# Patient Record
Sex: MALE | Race: WHITE | NOT HISPANIC OR LATINO | URBAN - METROPOLITAN AREA
[De-identification: names, ages, dates, MRNs, and addresses within clinical notes are randomized per-mention and may not be internally consistent; named-entity substitution may affect disease eponyms.]

---

## 2017-12-26 ENCOUNTER — EMERGENCY (EMERGENCY)
Facility: HOSPITAL | Age: 62
LOS: 0 days | Discharge: HOME | End: 2017-12-27

## 2017-12-26 DIAGNOSIS — I10 ESSENTIAL (PRIMARY) HYPERTENSION: ICD-10-CM

## 2017-12-26 DIAGNOSIS — R51 HEADACHE: ICD-10-CM

## 2020-03-24 ENCOUNTER — TRANSCRIPTION ENCOUNTER (OUTPATIENT)
Age: 65
End: 2020-03-24

## 2020-04-01 ENCOUNTER — INPATIENT (INPATIENT)
Facility: HOSPITAL | Age: 65
LOS: 5 days | Discharge: HOME | End: 2020-04-07
Attending: HOSPITALIST | Admitting: HOSPITALIST
Payer: COMMERCIAL

## 2020-04-01 VITALS
HEART RATE: 118 BPM | RESPIRATION RATE: 18 BRPM | SYSTOLIC BLOOD PRESSURE: 127 MMHG | OXYGEN SATURATION: 91 % | TEMPERATURE: 100 F | WEIGHT: 205.91 LBS | HEIGHT: 69 IN | DIASTOLIC BLOOD PRESSURE: 72 MMHG

## 2020-04-01 DIAGNOSIS — A41.89 OTHER SPECIFIED SEPSIS: ICD-10-CM

## 2020-04-01 DIAGNOSIS — I10 ESSENTIAL (PRIMARY) HYPERTENSION: ICD-10-CM

## 2020-04-01 DIAGNOSIS — J96.01 ACUTE RESPIRATORY FAILURE WITH HYPOXIA: ICD-10-CM

## 2020-04-01 DIAGNOSIS — F51.04 PSYCHOPHYSIOLOGIC INSOMNIA: ICD-10-CM

## 2020-04-01 DIAGNOSIS — U07.1 COVID-19: ICD-10-CM

## 2020-04-01 DIAGNOSIS — E87.2 ACIDOSIS: ICD-10-CM

## 2020-04-01 DIAGNOSIS — J12.89 OTHER VIRAL PNEUMONIA: ICD-10-CM

## 2020-04-01 DIAGNOSIS — R65.20 SEVERE SEPSIS WITHOUT SEPTIC SHOCK: ICD-10-CM

## 2020-04-01 DIAGNOSIS — R74.0 NONSPECIFIC ELEVATION OF LEVELS OF TRANSAMINASE AND LACTIC ACID DEHYDROGENASE [LDH]: ICD-10-CM

## 2020-04-01 LAB
ALBUMIN SERPL ELPH-MCNC: 4.1 G/DL — SIGNIFICANT CHANGE UP (ref 3.5–5.2)
ALP SERPL-CCNC: 183 U/L — HIGH (ref 30–115)
ALT FLD-CCNC: 211 U/L — HIGH (ref 0–41)
ANION GAP SERPL CALC-SCNC: 17 MMOL/L — HIGH (ref 7–14)
AST SERPL-CCNC: 166 U/L — HIGH (ref 0–41)
BASE EXCESS BLDV CALC-SCNC: 3.4 MMOL/L — HIGH (ref -2–2)
BASOPHILS # BLD AUTO: 0.01 K/UL — SIGNIFICANT CHANGE UP (ref 0–0.2)
BASOPHILS NFR BLD AUTO: 0.1 % — SIGNIFICANT CHANGE UP (ref 0–1)
BILIRUB SERPL-MCNC: 0.9 MG/DL — SIGNIFICANT CHANGE UP (ref 0.2–1.2)
BUN SERPL-MCNC: 19 MG/DL — SIGNIFICANT CHANGE UP (ref 10–20)
CA-I SERPL-SCNC: 1.03 MMOL/L — LOW (ref 1.12–1.3)
CALCIUM SERPL-MCNC: 8.4 MG/DL — LOW (ref 8.5–10.1)
CHLORIDE SERPL-SCNC: 93 MMOL/L — LOW (ref 98–110)
CO2 SERPL-SCNC: 25 MMOL/L — SIGNIFICANT CHANGE UP (ref 17–32)
CREAT SERPL-MCNC: 1.2 MG/DL — SIGNIFICANT CHANGE UP (ref 0.7–1.5)
D DIMER BLD IA.RAPID-MCNC: 292 NG/ML DDU — HIGH (ref 0–230)
EOSINOPHIL # BLD AUTO: 0 K/UL — SIGNIFICANT CHANGE UP (ref 0–0.7)
EOSINOPHIL NFR BLD AUTO: 0 % — SIGNIFICANT CHANGE UP (ref 0–8)
ERYTHROCYTE [SEDIMENTATION RATE] IN BLOOD: 63 MM/HR — HIGH (ref 0–10)
GAS PNL BLDV: 134 MMOL/L — LOW (ref 136–145)
GAS PNL BLDV: SIGNIFICANT CHANGE UP
GLUCOSE SERPL-MCNC: 144 MG/DL — HIGH (ref 70–99)
HCO3 BLDV-SCNC: 28 MMOL/L — SIGNIFICANT CHANGE UP (ref 22–29)
HCT VFR BLD CALC: 41.1 % — LOW (ref 42–52)
HCT VFR BLDA CALC: 63.5 % — HIGH (ref 34–44)
HGB BLD CALC-MCNC: 20.7 G/DL — CRITICAL HIGH (ref 14–18)
HGB BLD-MCNC: 14.4 G/DL — SIGNIFICANT CHANGE UP (ref 14–18)
IMM GRANULOCYTES NFR BLD AUTO: 0.3 % — SIGNIFICANT CHANGE UP (ref 0.1–0.3)
LACTATE BLDV-MCNC: 2 MMOL/L — HIGH (ref 0.5–1.6)
LDH SERPL L TO P-CCNC: 587 U/L — HIGH (ref 50–242)
LYMPHOCYTES # BLD AUTO: 0.7 K/UL — LOW (ref 1.2–3.4)
LYMPHOCYTES # BLD AUTO: 8.1 % — LOW (ref 20.5–51.1)
MAGNESIUM SERPL-MCNC: 2.4 MG/DL — SIGNIFICANT CHANGE UP (ref 1.8–2.4)
MCHC RBC-ENTMCNC: 31.4 PG — HIGH (ref 27–31)
MCHC RBC-ENTMCNC: 35 G/DL — SIGNIFICANT CHANGE UP (ref 32–37)
MCV RBC AUTO: 89.7 FL — SIGNIFICANT CHANGE UP (ref 80–94)
MONOCYTES # BLD AUTO: 0.57 K/UL — SIGNIFICANT CHANGE UP (ref 0.1–0.6)
MONOCYTES NFR BLD AUTO: 6.6 % — SIGNIFICANT CHANGE UP (ref 1.7–9.3)
NEUTROPHILS # BLD AUTO: 7.33 K/UL — HIGH (ref 1.4–6.5)
NEUTROPHILS NFR BLD AUTO: 84.9 % — HIGH (ref 42.2–75.2)
NRBC # BLD: 0 /100 WBCS — SIGNIFICANT CHANGE UP (ref 0–0)
NT-PROBNP SERPL-SCNC: 18 PG/ML — SIGNIFICANT CHANGE UP (ref 0–300)
PCO2 BLDV: 39 MMHG — LOW (ref 41–51)
PH BLDV: 7.45 — HIGH (ref 7.26–7.43)
PHOSPHATE SERPL-MCNC: 2.6 MG/DL — SIGNIFICANT CHANGE UP (ref 2.1–4.9)
PLATELET # BLD AUTO: 257 K/UL — SIGNIFICANT CHANGE UP (ref 130–400)
PO2 BLDV: 32 MMHG — SIGNIFICANT CHANGE UP (ref 20–40)
POTASSIUM BLDV-SCNC: 3.3 MMOL/L — SIGNIFICANT CHANGE UP (ref 3.3–5.6)
POTASSIUM SERPL-MCNC: 4 MMOL/L — SIGNIFICANT CHANGE UP (ref 3.5–5)
POTASSIUM SERPL-SCNC: 4 MMOL/L — SIGNIFICANT CHANGE UP (ref 3.5–5)
PROT SERPL-MCNC: 6.8 G/DL — SIGNIFICANT CHANGE UP (ref 6–8)
RBC # BLD: 4.58 M/UL — LOW (ref 4.7–6.1)
RBC # FLD: 13.3 % — SIGNIFICANT CHANGE UP (ref 11.5–14.5)
SAO2 % BLDV: 62 % — SIGNIFICANT CHANGE UP
SODIUM SERPL-SCNC: 135 MMOL/L — SIGNIFICANT CHANGE UP (ref 135–146)
TROPONIN T SERPL-MCNC: <0.01 NG/ML — SIGNIFICANT CHANGE UP
WBC # BLD: 8.64 K/UL — SIGNIFICANT CHANGE UP (ref 4.8–10.8)
WBC # FLD AUTO: 8.64 K/UL — SIGNIFICANT CHANGE UP (ref 4.8–10.8)

## 2020-04-01 PROCEDURE — 93010 ELECTROCARDIOGRAM REPORT: CPT

## 2020-04-01 PROCEDURE — 71045 X-RAY EXAM CHEST 1 VIEW: CPT | Mod: 26

## 2020-04-01 PROCEDURE — 99285 EMERGENCY DEPT VISIT HI MDM: CPT

## 2020-04-01 RX ORDER — HYDROXYCHLOROQUINE SULFATE 200 MG
400 TABLET ORAL EVERY 12 HOURS
Refills: 0 | Status: COMPLETED | OUTPATIENT
Start: 2020-04-01 | End: 2020-04-02

## 2020-04-01 RX ORDER — ENOXAPARIN SODIUM 100 MG/ML
40 INJECTION SUBCUTANEOUS DAILY
Refills: 0 | Status: DISCONTINUED | OUTPATIENT
Start: 2020-04-01 | End: 2020-04-07

## 2020-04-01 RX ORDER — HYDROXYCHLOROQUINE SULFATE 200 MG
TABLET ORAL
Refills: 0 | Status: DISCONTINUED | OUTPATIENT
Start: 2020-04-01 | End: 2020-04-01

## 2020-04-01 RX ORDER — HYDROXYCHLOROQUINE SULFATE 200 MG
TABLET ORAL
Refills: 0 | Status: COMPLETED | OUTPATIENT
Start: 2020-04-01 | End: 2020-04-06

## 2020-04-01 RX ORDER — ACETAMINOPHEN 500 MG
975 TABLET ORAL ONCE
Refills: 0 | Status: COMPLETED | OUTPATIENT
Start: 2020-04-01 | End: 2020-04-01

## 2020-04-01 RX ORDER — ACETAMINOPHEN 500 MG
650 TABLET ORAL EVERY 4 HOURS
Refills: 0 | Status: DISCONTINUED | OUTPATIENT
Start: 2020-04-01 | End: 2020-04-01

## 2020-04-01 RX ORDER — IBUPROFEN 200 MG
400 TABLET ORAL EVERY 6 HOURS
Refills: 0 | Status: DISCONTINUED | OUTPATIENT
Start: 2020-04-01 | End: 2020-04-03

## 2020-04-01 RX ORDER — SODIUM CHLORIDE 9 MG/ML
500 INJECTION, SOLUTION INTRAVENOUS ONCE
Refills: 0 | Status: COMPLETED | OUTPATIENT
Start: 2020-04-01 | End: 2020-04-01

## 2020-04-01 RX ORDER — HYDROXYCHLOROQUINE SULFATE 200 MG
200 TABLET ORAL EVERY 12 HOURS
Refills: 0 | Status: COMPLETED | OUTPATIENT
Start: 2020-04-02 | End: 2020-04-06

## 2020-04-01 RX ORDER — SODIUM CHLORIDE 9 MG/ML
1000 INJECTION, SOLUTION INTRAVENOUS
Refills: 0 | Status: DISCONTINUED | OUTPATIENT
Start: 2020-04-01 | End: 2020-04-07

## 2020-04-01 RX ADMIN — SODIUM CHLORIDE 500 MILLILITER(S): 9 INJECTION, SOLUTION INTRAVENOUS at 10:36

## 2020-04-01 RX ADMIN — Medication 400 MILLIGRAM(S): at 20:35

## 2020-04-01 RX ADMIN — Medication 975 MILLIGRAM(S): at 10:36

## 2020-04-01 RX ADMIN — Medication 1200 MILLIGRAM(S): at 20:34

## 2020-04-01 RX ADMIN — SODIUM CHLORIDE 100 MILLILITER(S): 9 INJECTION, SOLUTION INTRAVENOUS at 22:33

## 2020-04-01 NOTE — H&P ADULT - HISTORY OF PRESENT ILLNESS
65 year old M w/ PMHx of HTN presenting with worsening cough and dyspnea.  worsening dyspnea and smokes cigars at times, presents with ~ 8 days of dry cough, associated with sob worse with exertion, fever, chills, diarrhea, non-bloody. son is positive with COVID. wife is sick with similar symptoms.     Patient admits to cough x 8 days, worsening dyspnea x 2 days.     cxxPatient states sxs are severe, worsening, worse with ambulation, better with lying down. + sick contacts at home but no corona + patients. no abd pain but admits to nonbloody diarrhea about 3 episodes per day. Patient sating 88% on room air with exertion. 65 year old M w/ PMHx of HTN presenting with worsening cough and dyspnea.  Patient states that he has had 8 days of dry cough, associated with sob, worse with exertion, better when laying flat,  fever, chills and  non-bloody diarrhea. He has a son who tested positive with COVID and his wife is sick with similar symptoms. Patient saturating 91% on room air at rest and  88% on room air with exertion. 65 year old M w/ PMHx of HTN presenting with worsening cough and dyspnea.  Patient states that he has had 8 days of dry cough, associated with sob, worse with exertion, better when laying flat,  fever, chills and  non-bloody diarrhea. He has a son who tested positive with COVID after going to [place] for Spring break, returning on March 16th.  He and his wife is became sick with similar symptoms. Patient saturating 91% on room air at rest and 88% on room air with exertion.  During presentation he appears comfortable on nasal cannula (though not placed properly on his nose).      T(C): 38 (04-01-20 @ 09:34), Max: 38 (04-01-20 @ 09:34)  HR: 118 (04-01-20 @ 09:34) (118 - 118)  BP: 127/72 (04-01-20 @ 09:34) (127/72 - 127/72)  RR: 18 (04-01-20 @ 09:34) (18 - 18)  SpO2: 91% (04-01-20 @ 09:34) (91% - 91%) 65 year old M w/ PMHx of HTN presenting with worsening cough and dyspnea. Patient states that he has had 8 days of dry cough, associated with sob, worse with exertion, better when laying flat, fever, chills and non-bloody diarrhea. He has a son who tested positive with COVID after going to [place] for Spring break, returning on March 16th.  He and his wife is became sick with similar symptoms. Patient saturating 91% on room air at rest and 88% on room air with exertion.  During presentation he appears comfortable on nasal cannula (though not placed properly on his nose).      T(C): 38 (04-01-20 @ 09:34), Max: 38 (04-01-20 @ 09:34)  HR: 118 (04-01-20 @ 09:34) (118 - 118)  BP: 127/72 (04-01-20 @ 09:34) (127/72 - 127/72)  RR: 18 (04-01-20 @ 09:34) (18 - 18)  SpO2: 91% (04-01-20 @ 09:34) (91% - 91%)

## 2020-04-01 NOTE — ED PROVIDER NOTE - NS ED ROS FT
Review of Systems         Constitutional: (+) fever (-) chills (+) weakness       EENT: (-) sore throat (-) congestion       Cardiovascular: (-) chest pain (-) syncope       Respiratory: (+) cough, (+) shortness of breath       Gastrointestinal: (-) abdominal pain (-) vomiting (+) diarrhea (-) nausea (-) constipation       Genitourinary: (-) dysuria       Musculoskeletal: (-) neck pain (-) back pain (-) joint pain       Integumentary: (-) rash       Neurological: (-) headache (-) altered mental status (-) dizziness (-) paresthesias       Psych: (-) psych history

## 2020-04-01 NOTE — H&P ADULT - NSCORESITESY/N_GEN_A_CORE_RD
Gastroenterology Fellow   Discussed with advanced GI team , rec outpatient follow up in GI Clinic for elective EUS of submucosal duodenal lesion. No

## 2020-04-01 NOTE — H&P ADULT - ASSESSMENT
65 year old M w/ PMHx of HTN presenting with worsening cough and dyspnea.        - Pt clinically stable at this time. Currently saturating 91% on RA .  - CXR w/ Patchy bilateral opacifications consistent w/ viral PNA  - Labwork significant for lymphopenia  - Covid-19 PCR sent.   - C/w airborne and contact isolation  - Use Supplemental O2 as needed. Maintain SaO2>90%  - CRP and Procalcitonin ordered  - Start Plaquenil 400mg q12hr loading dose. C/w 200mg q12hr x4 days.   - QTc on admission:  EP eval for QTc monitoring while on Plaquenil 65 year old M w/ PMHx of HTN presenting with worsening cough and dyspnea.    #) Suspected 2/2 SARS-CoV-2 Infection  - Pt clinically stable at this time. Currently saturating 91% on RA, 88% on Ambulation per ED notes.   - CXR w/ Patchy bilateral opacifications consistent w/ viral PNA  - Labwork significant for lymphopenia  - Covid-19 PCR sent.   - C/w airborne and contact isolation  - Use Supplemental O2 as needed. Maintain SaO2>90%  - CRP and Procalcitonin ordered  - Start Plaquenil 400mg q12hr loading dose if requiring supplemental oxygen. C/w 200mg q12hr x4 days.   -  if Plaquenil added check Qtc, EP Consult for Qtc monitoring   - If condition worsens add Azithromycin 500 mg PO x 1 followed by 250 mg PO daily for 4 days.     #) Transaminitis  -likely secondary to viral illness  -trend     #) Hypertension  -Cont home meds     DVT ppx:  Lovenox     GI ppx: Not indicated    Diet:  DASH     Activity: Inrease as tolerated    Dispo:  Home when stable 65 year old M w/ PMHx of HTN presenting with worsening cough and dyspnea.    #) Suspected 2/2 SARS-CoV-2 Infection  - Pt clinically stable at this time. Currently saturating 91% on RA, 88% on Ambulation per ED notes.   - CXR w/ Patchy bilateral opacifications consistent w/ viral PNA  - Labwork significant for lymphopenia  - Covid-19 PCR sent.   - C/w airborne and contact isolation  - Use Supplemental O2 as needed. Maintain SaO2>90%  - CRP and Procalcitonin levels added on to previous labs   - Start Plaquenil 400mg q12hr loading dose if requiring supplemental oxygen. C/w 200mg q12hr x4 days.   -  if Plaquenil added check Qtc, EP Consult for Qtc monitoring   - If condition worsens add Azithromycin 500 mg PO x 1 followed by 250 mg PO daily for 4 days.     #) Transaminitis  -likely secondary to viral illness  -trend     #) Hypertension  -Cont Metoprolol Succinate (unsure of dose, will start 25 mg daily)     DVT ppx:  Lovenox     GI ppx: Not indicated    Diet:  DASH     Activity: Inrease as tolerated    Dispo:  Home when stable 65 year old M w/ PMHx of HTN presenting with worsening cough and dyspnea.    #) Suspected 2/2 SARS-CoV-2 Infection  - Pt clinically stable at this time. Currently saturating 91% on RA, 88% on Ambulation per ED notes.   - CXR w/ Patchy bilateral opacifications consistent w/ viral PNA  - Labwork significant for lymphopenia  - Covid-19 PCR sent  - C/w airborne and contact isolation  - Use Supplemental O2 as needed. Maintain SaO2>90%  - CRP and Procalcitonin levels added on to previous labs   - EKG lost in ED (not in [place], [place] or physically), will re-order, Start Plaquenil 400mg q12hr loading dose after Qtc obtained. /w 200mg q12hr x4 days.   - When Plaquenil added check Qtc, EP Consult for Qtc monitoring   - If condition worsens add Azithromycin 500 mg PO x 1 followed by 250 mg PO daily for 4 days.     #) Transaminitis  -likely secondary to viral illness  -trend     #) Hypertension  -BP x 24 hours: BP:  (127/72 - 127/72)  -Cont Metoprolol Succinate (unsure of dose, will start 25 mg daily)     DVT ppx:  Lovenox     GI ppx: Not indicated    Diet:  DASH     Activity: Increase as tolerated  	  Dispo:  Home when stable 65 year old M w/ PMHx of HTN presenting with worsening cough and dyspnea.    #) Cough, Dyspnea, likely secondary to suspected 2/2 SARS-CoV-2 Infection  - Pt clinically stable at this time. Currently saturating 91% on RA, 88% on Ambulation per ED notes.   - CXR w/ Patchy bilateral opacifications consistent w/ viral PNA  - Labwork significant for lymphopenia  - Covid-19 PCR sent  - C/w airborne and contact isolation  - Use Supplemental O2 as needed. Maintain SaO2>90%  - CRP and Procalcitonin levels added on to previous labs   - EKG lost in ED (not in [place], [place] or physically), will re-order, Start Plaquenil 400mg q12hr loading dose after Qtc obtained. /w 200mg q12hr x4 days.   - When Plaquenil added check Qtc, EP Consult for Qtc monitoring   - If condition worsens add Azithromycin 500 mg PO x 1 followed by 250 mg PO daily for 4 days.     #) Transaminitis  -likely secondary to viral illness  -trend     #) Hypertension  -BP x 24 hours: BP:  (127/72 - 127/72)  -Cont Metoprolol Succinate (unsure of dose, will start 25 mg daily)     DVT ppx:  Lovenox     GI ppx: Not indicated    Diet:  DASH     Activity: Increase as tolerated  	  Dispo:  Home when stable

## 2020-04-01 NOTE — ED PROVIDER NOTE - CLINICAL SUMMARY MEDICAL DECISION MAKING FREE TEXT BOX
pt aware of all labs and imaging, resting at this time, on NC with improvement, COVID testing sent, isolation precautions, medical admitting team aware of pt and admission.

## 2020-04-01 NOTE — ED PROVIDER NOTE - PHYSICAL EXAMINATION
Physical Exam    Vital Signs: I have reviewed the initial vital signs  Constitutional: well-nourished, appears stated age, flu-like, mild resp distress  EENT: Conjunctiva pink, Sclera clear, PERRLA, EOMI. Mucous membranes moist, no exudates or lesions noted, uvula midline.  Cardiovascular: S1 and S2 present, tachycardic, regular rhythm. Well perfused extremities, no peripheral edema  Respiratory: increased respiratory effort, broken sentences, b/l basilar rales no rhonchi  Gastrointestinal: soft, non-tender abdomen. No guarding or rebound tenderness  Musculoskeletal: supple nontender neck, no midline tenderness, no joint pain  Integumentary: warm, dry, no rash  Psychiatric: appropriate mood, appropriate affect

## 2020-04-01 NOTE — PATIENT PROFILE ADULT - DISASTER - NSTOBACCOWITHDRW_GEN_A_CORE_SD
Loren Yuan  1964  [id number]      HISTORY OF PRESENT ILLNESS:  Ms. Oscar Knowles is a 48 y.o. female returns for a follow up visit for pain management  She has a diagnosis of   1. Chronic pain syndrome    2. Failed neck syndrome    3. Status post cervical arthrodesis    4. Failed back surgical syndrome    5. Primary insomnia    6. Chronic idiopathic constipation    7. Fibromyalgia    8. Intractable chronic migraine without aura and without status migrainosus    9. Cervical stenosis of spinal canal    10. Drug-induced constipation    11. Cervical myelopathy ([facility] [address]    12. Mood disorder ([facility] [address]    . She complains of pain in the head, neck, bilateral shoulders, whole back with radiation down bilateral feet She rates the pain 7/10 and describes it as aching, burning, throbbing, numbness, pins and needles. Current treatment regimen has helped relieve about 30% of the pain. She has dizziness/lightheadedness any side effects from the current pain regimen. Patient reports that since the last follow up visit the physical functioning is worse, family/social relationships are worse, mood is worse sleep patterns are unchanged, and that the overall functioning is worse. Patient denies misusing/abusing her narcotic pain medications or using any illegal drugs. There are No indicators for possible drug abuse, addiction or diversion problems. Ms. Oscar Knowles states last week she has not been great. She states she has been feeling weepy, states several people said unkind things and it all piled up. She mentions she is using Lexapro 20 mg daily. She says she is using Percocet 3-4 per day, which is helping with the pain. Patient denies any side effects with the medications. She states her headache symptoms are manageable. Patient denies any constipation symptoms. ALLERGIES: Patients list of allergies were reviewed     MEDICATIONS: Ms. Oscar Knowles list of medications were reviewed. Her current medications are   Outpatient Medications Prior to Visit   Medication Sig Dispense Refill    pregabalin (LYRICA) 150 MG capsule Take one tablet po BID 60 capsule 0    lidocaine (LIDODERM) 5 % Place 1 patch onto the skin daily 12 hours on, 12 hours off. 30 patch 0    escitalopram (LEXAPRO) 20 MG tablet take 1 tab everyday 30 tablet 0    oxyCODONE-acetaminophen (PERCOCET) 7.5-325 MG per tablet Take 1 tablet by mouth every 6 hours as needed for Pain for up to 28 days. Max 3-4 a day 100 tablet 0    Methylnaltrexone Bromide (RELISTOR) 150 MG TABS Take 2-3 tablets by mouth daily 90 tablet 0    topiramate ER (TROKENDI XR) 100 MG CP24 TAKE 1 CAPSULE BY MOUTH DAILY 30 capsule 0    hydrOXYzine (ATARAX) 25 MG tablet Take 1-2 tablets by mouth nightly 60 tablet 0    tiZANidine (ZANAFLEX) 4 MG tablet Take 1/2 tablet PO in the AM and 1 tablet PO in the PM 60 tablet 0    amitriptyline (ELAVIL) 25 MG tablet Take 1-2 tablets by mouth nightly 60 tablet 0    losartan (COZAAR) 100 MG tablet Take 100 mg by mouth      Trolamine Salicylate (ASPERCREME EX) Apply topically      acyclovir (ZOVIRAX) 200 MG capsule TAKE ONE CAPSULE BY MOUTH FOUR TIMES DAILY FOR 5 DAYS 20 capsule 0    acyclovir (ZOVIRAX) 5 % ointment APPLY EXTERNALLY TO THE AFFECTED AREA FIVE TIMES DAILY 15 g 0    metoprolol succinate (TOPROL XL) 100 MG extended release tablet TAKE 1 TABLET BY MOUTH EVERY DAY 30 tablet 0    Amphetamine-Dextroamphetamine (AMPHETAMINE SALT COMBO PO) Take 10 mg by mouth daily      fluticasone (FLONASE) 50 MCG/ACT nasal spray 2 sprays by Nasal route daily 1 Bottle 3    ibuprofen (ADVIL;MOTRIN) 200 MG tablet Take 200 mg by mouth every 6 hours as needed for Pain.  diphenhydrAMINE (BENADRYL ALLERGY) 25 MG tablet Take 25 mg by mouth every 6 hours as needed for Itching.  Compression Stockings MISC by Does not apply route. 1 each 0    Multiple Vitamin (MULTIVITAMIN PO) Take  by mouth. No facility-administered medications prior to visit. SOCIAL/FAMILY/PAST MEDICAL HISTORY: Ms. Fabien Swain, family and past medical history was reviewed. REVIEW OF SYSTEMS:    Respiratory: Negative for apnea, chest tightness and shortness of breath or change in baseline breathing. Gastrointestinal: Negative for nausea, vomiting, abdominal pain, diarrhea, constipation, blood in stool and abdominal distention. PHYSICAL EXAM:   Nursing note and vitals reviewed. /71   Pulse 67   Wt 263 lb (119.3 kg)   Breastfeeding No   BMI 39.99 kg/m²   Constitutional: She appears well-developed and well-nourished. No acute distress. Skin: Skin is warm and dry, good turgor. No rash noted. She is not diaphoretic. Cardiovascular: Normal rate, regular rhythm, normal heart sounds, and does not have murmur. Pulmonary/Chest: Effort normal. No respiratory distress. She does not have wheezes in the lung fields. She has no rales. Neurological/Psychiatric:She is alert and oriented to person, place, and time. Coordination is  normal.  Her mood isAppropriate and affect is Flat/blunted and Anxious . IMPRESSION:   1. Chronic pain syndrome    2. Failed neck syndrome    3. Status post cervical arthrodesis    4. Failed back surgical syndrome    5. Fibromyalgia    6. Cervical stenosis of spinal canal    7. Cervical myelopathy (HCC)        PLAN:  Informed verbal consent was obtained  -continue with current opioid regimen, Percocet 3-4 per day  -Adv Biofeedback, relaxation and meditation techniques.  Referral to psychologist for CBT was also discussed with patient  -continue with Lexapro 20 mg   -continue with Atarax PRN for anxiety  -advised to start seeing a psychologist but refusing  -She was advised to increase fluids ( 5-7  glasses of fluid daily), limit caffeine, avoid cheese products, increase dietary fiber, increase activity and exercise as tolerated and relax regularly and enjoy meals      Current Outpatient Medications   Medication Sig Dispense Refill  pregabalin (LYRICA) 150 MG capsule Take one tablet po BID 60 capsule 0    lidocaine (LIDODERM) 5 % Place 1 patch onto the skin daily 12 hours on, 12 hours off. 30 patch 0    escitalopram (LEXAPRO) 20 MG tablet take 1 tab everyday 30 tablet 0    oxyCODONE-acetaminophen (PERCOCET) 7.5-325 MG per tablet Take 1 tablet by mouth every 6 hours as needed for Pain for up to 28 days. Max 3-4 a day 100 tablet 0    Methylnaltrexone Bromide (RELISTOR) 150 MG TABS Take 2-3 tablets by mouth daily 90 tablet 0    topiramate ER (TROKENDI XR) 100 MG CP24 TAKE 1 CAPSULE BY MOUTH DAILY 30 capsule 0    hydrOXYzine (ATARAX) 25 MG tablet Take 1-2 tablets by mouth nightly 60 tablet 0    tiZANidine (ZANAFLEX) 4 MG tablet Take 1/2 tablet PO in the AM and 1 tablet PO in the PM 60 tablet 0    amitriptyline (ELAVIL) 25 MG tablet Take 1-2 tablets by mouth nightly 60 tablet 0    losartan (COZAAR) 100 MG tablet Take 100 mg by mouth      Trolamine Salicylate (ASPERCREME EX) Apply topically      acyclovir (ZOVIRAX) 200 MG capsule TAKE ONE CAPSULE BY MOUTH FOUR TIMES DAILY FOR 5 DAYS 20 capsule 0    acyclovir (ZOVIRAX) 5 % ointment APPLY EXTERNALLY TO THE AFFECTED AREA FIVE TIMES DAILY 15 g 0    metoprolol succinate (TOPROL XL) 100 MG extended release tablet TAKE 1 TABLET BY MOUTH EVERY DAY 30 tablet 0    Amphetamine-Dextroamphetamine (AMPHETAMINE SALT COMBO PO) Take 10 mg by mouth daily      fluticasone (FLONASE) 50 MCG/ACT nasal spray 2 sprays by Nasal route daily 1 Bottle 3    ibuprofen (ADVIL;MOTRIN) 200 MG tablet Take 200 mg by mouth every 6 hours as needed for Pain.  diphenhydrAMINE (BENADRYL ALLERGY) 25 MG tablet Take 25 mg by mouth every 6 hours as needed for Itching.  Compression Stockings MISC by Does not apply route. 1 each 0    Multiple Vitamin (MULTIVITAMIN PO) Take  by mouth. No current facility-administered medications for this visit.       I will continue her current medication regimen  which is part maintain the accuracy of the note in terms of it's contents,there may have been some errors made inadvertently. Thank you for allowing me to participate in the care of this patient.     Bert Estevez MD.    Cc: Nadine Rodarte MD NA

## 2020-04-01 NOTE — H&P ADULT - ATTENDING COMMENTS
65M PMH: HTN presenting with fever, malaise, dry cough and ROMAN with Sats RA ambulation 88% and RA at rest 91%. Pt has been ill for 8 days and his wife also not feeling well. Their son from [facility] tested positive for COVID19. At this time pt feels general weakness, chills and dyspnea, speaking in short sentences without Oxygen on. Now at 4L sats 95%.     Vital Signs Last 24 Hrs  T(C): 39.4 (01 Apr 2020 20:01), Max: 39.4 (01 Apr 2020 20:01)  T(F): 103 (01 Apr 2020 20:01), Max: 103 (01 Apr 2020 20:01)  HR: 110 (01 Apr 2020 20:01) (98 - 118)  BP: 135/71 (01 Apr 2020 20:01) (127/72 - 135/71)  RR: 18 (01 Apr 2020 20:01) (18 - 18)  SpO2: 97% (01 Apr 2020 20:01) (91% - 97%)    CV: NL S1, S2  Resp: Crackles bilateral bases   GI: +BS, Soft, NT, ND  Ext: No e/c/c  MS: AOx3                          14.4   8.64  )-----------( 257      ( 01 Apr 2020 10:40 )             41.1     04-01    135  |  93<L>  |  19  ----------------------------<  144<H>  4.0   |  25  |  1.2    Ca    8.4<L>      01 Apr 2020 10:40  Phos  2.6     04-01  Mg     2.4     04-01    TPro  6.8  /  Alb  4.1  /  TBili  0.9  /  DBili  x   /  AST  166<H>  /  ALT  211<H>  /  AlkPhos  183<H>  04-01    CARDIAC MARKERS ( 01 Apr 2020 10:40 )  x     / <0.01 ng/mL / x     / x     / x    CXR:  Impression:    Patchy bilateral opacifications which may be indicative of viral pneumonia.    MEDICATIONS  (STANDING):  enoxaparin Injectable 40 milliGRAM(s) SubCutaneous daily  guaiFENesin ER 1200 milliGRAM(s) Oral every 12 hours  hydroxychloroquine 400 milliGRAM(s) Oral every 12 hours     MEDICATIONS  (PRN):  ibuprofen  Tablet. 400 milliGRAM(s) Oral every 6 hours PRN Temp greater or equal to 38C (100.4F)    Impression:  Highly Suspecting SARS-CoV-2 Infection with Acute Hypoxic Respiratory Failure   HTN (controlled)    Plan:  Admit to Inpatient  Oxygen Supplementation to maintain POx 95% (4L now)  Hydroxychloroquine 400mg po q12h x 1day then 400mg po qd x4days (use this dose to help minimize nursing exposure pls) not 200mg po q12  Daily EKGs to monitor QT Interval / today's EKG = WNL  f/u COVID19 PCR   Check: Ferritin, Fibrinogen, CK-MB/Trop (negative), D-Dimer, CRP, Procalcitonin   ID Consult   Hold BP Med for now  IVF LR 100cc/hr x 1L     Low Salt Diet    GI/DVT proph 65M PMH: HTN presenting with fever, malaise, dry cough and ROMAN with Sats RA ambulation 88% and RA at rest 91%. Pt has been ill for 8 days and his wife also not feeling well. Their son from [facility] tested positive for COVID19. At this time pt feels general weakness, chills and dyspnea, speaking in short sentences without Oxygen on. Now at 4L sats 95%.     Vital Signs Last 24 Hrs  T(C): 39.4 (01 Apr 2020 20:01), Max: 39.4 (01 Apr 2020 20:01)  T(F): 103 (01 Apr 2020 20:01), Max: 103 (01 Apr 2020 20:01)  HR: 110 (01 Apr 2020 20:01) (98 - 118)  BP: 135/71 (01 Apr 2020 20:01) (127/72 - 135/71)  RR: 18 (01 Apr 2020 20:01) (18 - 18)  SpO2: 97% (01 Apr 2020 20:01) (91% - 97%)    CV: NL S1, S2  Resp: Crackles bilateral bases   GI: +BS, Soft, NT, ND  Ext: No e/c/c  MS: AOx3                          14.4   8.64  )-----------( 257      ( 01 Apr 2020 10:40 )             41.1     04-01    135  |  93<L>  |  19  ----------------------------<  144<H>  4.0   |  25  |  1.2    Ca    8.4<L>      01 Apr 2020 10:40  Phos  2.6     04-01  Mg     2.4     04-01    TPro  6.8  /  Alb  4.1  /  TBili  0.9  /  DBili  x   /  AST  166<H>  /  ALT  211<H>  /  AlkPhos  183<H>  04-01    CARDIAC MARKERS ( 01 Apr 2020 10:40 )  x     / <0.01 ng/mL / x     / x     / x    CXR:  Impression:    Patchy bilateral opacifications which may be indicative of viral pneumonia.    MEDICATIONS  (STANDING):  enoxaparin Injectable 40 milliGRAM(s) SubCutaneous daily  guaiFENesin ER 1200 milliGRAM(s) Oral every 12 hours  hydroxychloroquine 400 milliGRAM(s) Oral every 12 hours     MEDICATIONS  (PRN):  ibuprofen  Tablet. 400 milliGRAM(s) Oral every 6 hours PRN Temp greater or equal to 38C (100.4F)    Impression:  Highly Suspecting SARS-CoV-2 Infection with Acute Hypoxic Respiratory Failure   HTN (controlled)    Plan:  Admit to Inpatient  Oxygen Supplementation to maintain POx 95% (4L now)  Hydroxychloroquine 400mg po q12h x 1day then 400mg po qd x4days (use this dose to help minimize nursing exposure pls) not 200mg po q12  Daily EKGs to monitor QT Interval / today's EKG = WNL  f/u COVID19 PCR   Check: Ferritin, Fibrinogen, CK-MB/Trop (negative), D-Dimer, CRP, Procalcitonin   ID Consult   IVF LR 100cc/hr x 1L   Tylenol or Motrin for fever     HTN:  Low Salt Diet  Metoprolol Succinate 25mg po q24h    DVT proph

## 2020-04-01 NOTE — H&P ADULT - NSHPLABSRESULTS_GEN_ALL_CORE
14.4   8.64  )-----------( 257      ( 01 Apr 2020 10:40 )             41.1       04-01    135  |  93<L>  |  19  ----------------------------<  144<H>  4.0   |  25  |  1.2    Ca    8.4<L>      01 Apr 2020 10:40    TPro  6.8  /  Alb  4.1  /  TBili  0.9  /  DBili  x   /  AST  166<H>  /  ALT  211<H>  /  AlkPhos  183<H>  04-01        Lactate Trend    CARDIAC MARKERS ( 01 Apr 2020 10:40 )  x     / <0.01 ng/mL / x     / x     / x           XR CHEST PORTABLE IMMED 1V            PROCEDURE DATE:  04/01/2020        Impression:      Patchy bilateral opacifications which may be indicative of viral pneumonia.    3. Clinical setting. 14.4   8.64  )-----------( 257      ( 01 Apr 2020 10:40 )             41.1     04-01    135  |  93<L>  |  19  ----------------------------<  144<H>  4.0   |  25  |  1.2    Ca    8.4<L>      01 Apr 2020 10:40    TPro  6.8  /  Alb  4.1  /  TBili  0.9  /  DBili  x   /  AST  166<H>  /  ALT  211<H>  /  AlkPhos  183<H>  04-01        Lactate Trend    CARDIAC MARKERS ( 01 Apr 2020 10:40 )  x     / <0.01 ng/mL / x     / x     / x        XR CHEST PORTABLE IMMED 1V          PROCEDURE DATE:  04/01/2020      Impression:      Patchy bilateral opacifications which may be indicative of viral pneumonia.    3. Clinical setting.

## 2020-04-01 NOTE — H&P ADULT - NSHPSOCIALHISTORY_GEN_ALL_CORE
Marital Status:  ( x  )    (   ) Single    (   )    (  )   Lives with: (  ) alone  (  ) children   ( x ) spouse   (  ) parents  (  ) other  Recent Travel: No recent travel  Occupation:     Substance Use (street drugs): ( x ) never used  (  ) other:  Tobacco Usage:  (   ) never smoked   (   ) former smoker   (  x ) current  cigar smoker  (     ) pack year  Alcohol Usage: None Marital Status:  ( x  )    (   ) Single    (   )    (  )   Lives with: (  ) alone  ( x ) children   ( x ) spouse   (  ) parents  (  ) other  Recent Travel: No recent travel  Occupation:  Retired     Substance Use (street drugs): ( x ) never used  (  ) other:  Tobacco Usage:  (   ) never smoked   (   ) former smoker   (  x ) current  occasional cigar smoker  (     ) pack year  Alcohol Usage: None

## 2020-04-01 NOTE — ED PROVIDER NOTE - PLAN OF CARE
Plan: Monitor, NC for O2, EKG, CXR, labs, COVID testing, isolation precautions, will continue to monitor and reassess.

## 2020-04-01 NOTE — ED PROVIDER NOTE - CARE PLAN
Assessment and plan of treatment:	Plan: Monitor, NC for O2, EKG, CXR, labs, COVID testing, isolation precautions, will continue to monitor and reassess. Principal Discharge DX:	Hypoxia  Assessment and plan of treatment:	Plan: Monitor, NC for O2, EKG, CXR, labs, COVID testing, isolation precautions, will continue to monitor and reassess.  Secondary Diagnosis:	Dyspnea on exertion  Secondary Diagnosis:	Viral illness  Secondary Diagnosis:	Transaminitis  Secondary Diagnosis:	Viral pneumonia

## 2020-04-01 NOTE — ED PROVIDER NOTE - ATTENDING CONTRIBUTION TO CARE
64 y/o m w/ pmhx of htn, smokes cigars at times, presents with ~ 8 days of dry cough, associated with sob worse with exertion, fever, chills, diarrhea, non-bloody. son is positive with COVID. wife is sick with similar symptoms. No  n/v, cp, pleuritic cp, palpitations, diaphoresis, ear pain, sore throat, neck pain/stiffness, abd pain, constipation, urinary symptoms, trauma, ha/lh/dizziness, numbness/tingling, recent travel, or rash.  on exam: Male pt sitting on stretcher in nad, speaking full sentences, no rash, warm to palpation, mmm, no erythema, exudates, or petechiae, no rhinorrhea, Tachycardiac, radial pulses 2/4 b/l, no jvd, ctabl w/ breath sounds present b/l, no wheezing or crackles, (+)  tachypnea, no stridor, bs present throughout all 4 quadrants, abd soft, nd, nt, no rebound tenderness or guarding, no cvat, FROM of ext, no edema, no calf pain/swelling/erythema, AAOx3.   motor 5/5 and sensation intact throughout upper and lower ext. No focal deficits.

## 2020-04-01 NOTE — H&P ADULT - NSHPPHYSICALEXAM_GEN_ALL_CORE
GENERAL: NAD, well-developed  PSYCH: AAOx3  HEENT:  Atraumatic, Normocephalic. EOMI, PERRLA, conjunctiva clear, sclera white, No JVD  PULMONARY: Tachypneic, Clear to auscultation bilaterally; No wheeze  CARDIOVASCULAR: Tachycardic; No murmurs, rubs, or gallops  GASTROINTESTINAL: Soft, Nontender, Nondistended; Bowel sounds present  MUSCULOSKELETAL:  2+ Peripheral Pulses, No clubbing, cyanosis, or edema  NEUROLOGY: non-focal  SKIN: No rashes or lesions

## 2020-04-01 NOTE — ED PROVIDER NOTE - OBJECTIVE STATEMENT
63 year old male hx hTN presenting with fever/dyspnea. Patient admits to cough x 8 days, worsening dyspnea x 2 days. Patient states sxs are severe, worsening, worse with ambulation, better with lying down. + sick contacts at home but no corona + patients. no abd pain but admits to nonbloody diarrhea about 3 episodes per day. Patient sating 88% on room air with exertion.

## 2020-04-01 NOTE — ED PROVIDER NOTE - PROGRESS NOTE DETAILS
pt aware of all labs and imaging, desaturation on ambulation, improved on nc, aware of plan for admission and agrees, medial admitting team aware of pt and admission.

## 2020-04-02 LAB
ALBUMIN SERPL ELPH-MCNC: 3.8 G/DL — SIGNIFICANT CHANGE UP (ref 3.5–5.2)
ALP SERPL-CCNC: 170 U/L — HIGH (ref 30–115)
ALT FLD-CCNC: 158 U/L — HIGH (ref 0–41)
ANION GAP SERPL CALC-SCNC: 16 MMOL/L — HIGH (ref 7–14)
APPEARANCE UR: CLEAR — SIGNIFICANT CHANGE UP
AST SERPL-CCNC: 92 U/L — HIGH (ref 0–41)
BACTERIA # UR AUTO: NEGATIVE — SIGNIFICANT CHANGE UP
BASOPHILS # BLD AUTO: 0.02 K/UL — SIGNIFICANT CHANGE UP (ref 0–0.2)
BASOPHILS NFR BLD AUTO: 0.2 % — SIGNIFICANT CHANGE UP (ref 0–1)
BILIRUB SERPL-MCNC: 1 MG/DL — SIGNIFICANT CHANGE UP (ref 0.2–1.2)
BILIRUB UR-MCNC: NEGATIVE — SIGNIFICANT CHANGE UP
BUN SERPL-MCNC: 18 MG/DL — SIGNIFICANT CHANGE UP (ref 10–20)
CALCIUM SERPL-MCNC: 8.3 MG/DL — LOW (ref 8.5–10.1)
CHLORIDE SERPL-SCNC: 93 MMOL/L — LOW (ref 98–110)
CO2 SERPL-SCNC: 28 MMOL/L — SIGNIFICANT CHANGE UP (ref 17–32)
COLOR SPEC: YELLOW — SIGNIFICANT CHANGE UP
CREAT SERPL-MCNC: 0.9 MG/DL — SIGNIFICANT CHANGE UP (ref 0.7–1.5)
CRP SERPL-MCNC: 10.9 MG/DL — HIGH (ref 0–0.4)
DIFF PNL FLD: NEGATIVE — SIGNIFICANT CHANGE UP
EOSINOPHIL # BLD AUTO: 0 K/UL — SIGNIFICANT CHANGE UP (ref 0–0.7)
EOSINOPHIL NFR BLD AUTO: 0 % — SIGNIFICANT CHANGE UP (ref 0–8)
EPI CELLS # UR: 3 /HPF — SIGNIFICANT CHANGE UP (ref 0–5)
FERRITIN SERPL-MCNC: 4024 NG/ML — HIGH (ref 30–400)
GLUCOSE SERPL-MCNC: 101 MG/DL — HIGH (ref 70–99)
GLUCOSE UR QL: NEGATIVE — SIGNIFICANT CHANGE UP
HCT VFR BLD CALC: 41.1 % — LOW (ref 42–52)
HCV AB S/CO SERPL IA: 0.03 COI — SIGNIFICANT CHANGE UP
HCV AB SERPL-IMP: SIGNIFICANT CHANGE UP
HGB BLD-MCNC: 13.6 G/DL — LOW (ref 14–18)
HYALINE CASTS # UR AUTO: 30 /LPF — HIGH (ref 0–7)
IMM GRANULOCYTES NFR BLD AUTO: 0.4 % — HIGH (ref 0.1–0.3)
KETONES UR-MCNC: SIGNIFICANT CHANGE UP
LEUKOCYTE ESTERASE UR-ACNC: NEGATIVE — SIGNIFICANT CHANGE UP
LYMPHOCYTES # BLD AUTO: 0.78 K/UL — LOW (ref 1.2–3.4)
LYMPHOCYTES # BLD AUTO: 7.3 % — LOW (ref 20.5–51.1)
MCHC RBC-ENTMCNC: 29.8 PG — SIGNIFICANT CHANGE UP (ref 27–31)
MCHC RBC-ENTMCNC: 33.1 G/DL — SIGNIFICANT CHANGE UP (ref 32–37)
MCV RBC AUTO: 90.1 FL — SIGNIFICANT CHANGE UP (ref 80–94)
MONOCYTES # BLD AUTO: 0.46 K/UL — SIGNIFICANT CHANGE UP (ref 0.1–0.6)
MONOCYTES NFR BLD AUTO: 4.3 % — SIGNIFICANT CHANGE UP (ref 1.7–9.3)
NEUTROPHILS # BLD AUTO: 9.41 K/UL — HIGH (ref 1.4–6.5)
NEUTROPHILS NFR BLD AUTO: 87.8 % — HIGH (ref 42.2–75.2)
NITRITE UR-MCNC: NEGATIVE — SIGNIFICANT CHANGE UP
NRBC # BLD: 0 /100 WBCS — SIGNIFICANT CHANGE UP (ref 0–0)
PH UR: 6 — SIGNIFICANT CHANGE UP (ref 5–8)
PLATELET # BLD AUTO: 285 K/UL — SIGNIFICANT CHANGE UP (ref 130–400)
POTASSIUM SERPL-MCNC: 4.2 MMOL/L — SIGNIFICANT CHANGE UP (ref 3.5–5)
POTASSIUM SERPL-SCNC: 4.2 MMOL/L — SIGNIFICANT CHANGE UP (ref 3.5–5)
PROCALCITONIN SERPL-MCNC: 0.22 NG/ML — HIGH (ref 0.02–0.1)
PROT SERPL-MCNC: 6.4 G/DL — SIGNIFICANT CHANGE UP (ref 6–8)
PROT UR-MCNC: ABNORMAL
RBC # BLD: 4.56 M/UL — LOW (ref 4.7–6.1)
RBC # FLD: 13.5 % — SIGNIFICANT CHANGE UP (ref 11.5–14.5)
RBC CASTS # UR COMP ASSIST: 6 /HPF — HIGH (ref 0–4)
SARS-COV-2 RNA SPEC QL NAA+PROBE: DETECTED
SODIUM SERPL-SCNC: 137 MMOL/L — SIGNIFICANT CHANGE UP (ref 135–146)
SP GR SPEC: 1.03 — HIGH (ref 1.01–1.02)
TROPONIN T SERPL-MCNC: <0.01 NG/ML — SIGNIFICANT CHANGE UP
UROBILINOGEN FLD QL: SIGNIFICANT CHANGE UP
WBC # BLD: 10.71 K/UL — SIGNIFICANT CHANGE UP (ref 4.8–10.8)
WBC # FLD AUTO: 10.71 K/UL — SIGNIFICANT CHANGE UP (ref 4.8–10.8)
WBC UR QL: 14 /HPF — HIGH (ref 0–5)

## 2020-04-02 PROCEDURE — 99223 1ST HOSP IP/OBS HIGH 75: CPT

## 2020-04-02 PROCEDURE — 99232 SBSQ HOSP IP/OBS MODERATE 35: CPT

## 2020-04-02 RX ORDER — LANOLIN ALCOHOL/MO/W.PET/CERES
5 CREAM (GRAM) TOPICAL ONCE
Refills: 0 | Status: COMPLETED | OUTPATIENT
Start: 2020-04-02 | End: 2020-04-02

## 2020-04-02 RX ORDER — ZOLPIDEM TARTRATE 10 MG/1
5 TABLET ORAL ONCE
Refills: 0 | Status: DISCONTINUED | OUTPATIENT
Start: 2020-04-02 | End: 2020-04-02

## 2020-04-02 RX ORDER — ACETAMINOPHEN 500 MG
650 TABLET ORAL ONCE
Refills: 0 | Status: COMPLETED | OUTPATIENT
Start: 2020-04-02 | End: 2020-04-02

## 2020-04-02 RX ADMIN — Medication 1200 MILLIGRAM(S): at 05:04

## 2020-04-02 RX ADMIN — Medication 400 MILLIGRAM(S): at 05:04

## 2020-04-02 RX ADMIN — Medication 200 MILLIGRAM(S): at 18:17

## 2020-04-02 RX ADMIN — Medication 650 MILLIGRAM(S): at 19:24

## 2020-04-02 RX ADMIN — Medication 1200 MILLIGRAM(S): at 18:17

## 2020-04-02 RX ADMIN — ZOLPIDEM TARTRATE 5 MILLIGRAM(S): 10 TABLET ORAL at 17:07

## 2020-04-02 RX ADMIN — Medication 5 MILLIGRAM(S): at 12:14

## 2020-04-02 NOTE — PROGRESS NOTE ADULT - SUBJECTIVE AND OBJECTIVE BOX
ASHWIN RODRIGUEZ 65y Male  MRN#: [id number]   CODE STATUS:___Full_____      SUBJECTIVE  Patient is a 65y old Male who presents with a chief complaint of Cough, dyspnea (01 Apr 2020 13:47)  Currently admitted to medicine with the primary diagnosis of Hypoxia  Today is hospital day 1d, and this morning he is feeling about the same, no worse and no better and reports no acute overnight events except for diarrhea.  Still on 4L NC and mildly tachypnic 22 but comfortable.   Main issue is that pt cannot sleep but says this has been going for 'months'.         OBJECTIVE  PAST MEDICAL & SURGICAL HISTORY  HTN (hypertension)  No significant past surgical history    ALLERGIES:  No Known Allergies    MEDICATIONS:  STANDING MEDICATIONS  enoxaparin Injectable 40 milliGRAM(s) SubCutaneous daily  guaiFENesin ER 1200 milliGRAM(s) Oral every 12 hours  hydroxychloroquine 200 milliGRAM(s) Oral every 12 hours  hydroxychloroquine   Oral   lactated ringers. 1000 milliLiter(s) IV Continuous <Continuous>  melatonin 5 milliGRAM(s) Oral once    PRN MEDICATIONS  ibuprofen  Tablet. 400 milliGRAM(s) Oral every 6 hours PRN      VITAL SIGNS: Last 24 Hours  T(C): 37.1 (02 Apr 2020 10:18), Max: 39.4 (01 Apr 2020 20:01)  T(F): 98.7 (02 Apr 2020 10:18), Max: 103 (01 Apr 2020 20:01)  HR: 101 (02 Apr 2020 10:18) (98 - 110)  BP: 137/75 (02 Apr 2020 10:18) (135/71 - 137/75)  BP(mean): --  RR: 18 (02 Apr 2020 10:18) (18 - 18)  SpO2: 95% (02 Apr 2020 10:18) (95% - 97%)    LABS:                        13.6   10.71 )-----------( 285      ( 02 Apr 2020 07:04 )             41.1     04-02    137  |  93<L>  |  18  ----------------------------<  101<H>  4.2   |  28  |  0.9    Ca    8.3<L>      02 Apr 2020 07:04  Phos  2.6     04-01  Mg     2.4     04-01    TPro  6.4  /  Alb  3.8  /  TBili  1.0  /  DBili  x   /  AST  92<H>  /  ALT  158<H>  /  AlkPhos  170<H>  04-02          Troponin T, Serum: <0.01 ng/mL (04-02-20 @ 07:04)  Sedimentation Rate, Erythrocyte: 63 mm/Hr <H> (04-01-20 @ 10:40)  Troponin T, Serum: <0.01 ng/mL (04-01-20 @ 10:40)      CARDIAC MARKERS ( 02 Apr 2020 07:04 )  x     / <0.01 ng/mL / x     / x     / x      CARDIAC MARKERS ( 01 Apr 2020 10:40 )  x     / <0.01 ng/mL / x     / x     / x          RADIOLOGY:      PHYSICAL EXAM:    GENERAL: NAD, well-developed, AAOx3, anxious and tired looking  HEENT:  Atraumatic, Normocephalic. EOMI, PERRLA, conjunctiva and sclera clear, No JVD  PULMONARY: Clear to auscultation bilaterally; tachypnea 22, No wheeze or crackles, on 3-4L NC  CARDIOVASCULAR: Regular rate ~100 and rhythm; No murmurs  GASTROINTESTINAL: mildly distended, mildly tender on deep palpation which pt says is due to diarrhea, Soft, Bowel sounds very active  MUSCULOSKELETAL:  2+ Peripheral Pulses, No periph edema  NEUROLOGY: non-focal  SKIN: No rashes or lesions      ADMISSION SUMMARY  Patient is a 65y old Male who presents with a chief complaint of Cough, dyspnea (01 Apr 2020 13:47)  Currently admitted to medicine with the primary diagnosis of Hypoxia  65 year old M w/ PMHx of HTN presenting with worsening cough and dyspnea. Patient states that he has had 8 days of dry cough, associated with sob, worse with exertion, better when laying flat, fever, chills and non-bloody diarrhea. He has a son who tested positive with COVID after going to [place] for Spring break, returning on March 16th.  He and his wife is became sick with similar symptoms. Patient saturating 91% on room air at rest and 88% on room air with exertion.  During presentation he appears comfortable on nasal cannula (though not placed properly on his nose).      Currently comfortable, remains on O2 3L 95%.       ASSESSMENT & PLAN  65 year old M w/ PMHx of HTN presenting with worsening cough and dyspnea.    #) COVID19+ pneumonia  - 97% on 3L, will titrate as possible though mildly tachypnic 22  - Use Supplemental O2 as needed. Maintain SaO2>90%  - CXR w/ Patchy bilateral opacifications   - C/w airborne and contact isolation  - CRP 10.9, procal 0.22, ferritin 4024  - EKG QTc 460, EP consult placed  - c/w plaquanil  - If condition worsens add Azithromycin 500 mg PO x 1 followed by 250 mg PO daily for 4 days.     #) Insomnia, chronic  - happening for months, hasn't had a chance to speak to primary  - will start melatonin now  - consider ambien?    #) Transaminitis  - likely secondary to viral illness  - improving though still elevated    #) Hypertension  - BP x 24 hours: BP:  (127/72 - 127/72)  - Cont Metoprolol Succinate 25 (but home dose unknown?)    #MISC  DVT ppx:  Lovenox   GI ppx: Not indicated  Diet:  DASH   Activity: Increase as tolerated  Dispo: likely home 24hr is stable    (x) Discussion with patient and/or family regarding goals of care  *** NO LABS TMW as blood work stable ***

## 2020-04-03 LAB
ALBUMIN SERPL ELPH-MCNC: 3.7 G/DL — SIGNIFICANT CHANGE UP (ref 3.5–5.2)
ALP SERPL-CCNC: 232 U/L — HIGH (ref 30–115)
ALT FLD-CCNC: 213 U/L — HIGH (ref 0–41)
ANION GAP SERPL CALC-SCNC: 16 MMOL/L — HIGH (ref 7–14)
AST SERPL-CCNC: 172 U/L — HIGH (ref 0–41)
BASOPHILS # BLD AUTO: 0.02 K/UL — SIGNIFICANT CHANGE UP (ref 0–0.2)
BASOPHILS NFR BLD AUTO: 0.2 % — SIGNIFICANT CHANGE UP (ref 0–1)
BILIRUB SERPL-MCNC: 1.4 MG/DL — HIGH (ref 0.2–1.2)
BUN SERPL-MCNC: 15 MG/DL — SIGNIFICANT CHANGE UP (ref 10–20)
CALCIUM SERPL-MCNC: 8.3 MG/DL — LOW (ref 8.5–10.1)
CHLORIDE SERPL-SCNC: 92 MMOL/L — LOW (ref 98–110)
CO2 SERPL-SCNC: 26 MMOL/L — SIGNIFICANT CHANGE UP (ref 17–32)
CREAT SERPL-MCNC: 0.9 MG/DL — SIGNIFICANT CHANGE UP (ref 0.7–1.5)
CRP SERPL-MCNC: 17.59 MG/DL — HIGH (ref 0–0.4)
CULTURE RESULTS: SIGNIFICANT CHANGE UP
EOSINOPHIL # BLD AUTO: 0 K/UL — SIGNIFICANT CHANGE UP (ref 0–0.7)
EOSINOPHIL NFR BLD AUTO: 0 % — SIGNIFICANT CHANGE UP (ref 0–8)
FERRITIN SERPL-MCNC: 3830 NG/ML — HIGH (ref 30–400)
FIBRINOGEN AG PPP IA-MCNC: 824 MG/DL — HIGH
GLUCOSE SERPL-MCNC: 104 MG/DL — HIGH (ref 70–99)
HCT VFR BLD CALC: 40.1 % — LOW (ref 42–52)
HGB BLD-MCNC: 13.3 G/DL — LOW (ref 14–18)
IMM GRANULOCYTES NFR BLD AUTO: 0.5 % — HIGH (ref 0.1–0.3)
LYMPHOCYTES # BLD AUTO: 1.06 K/UL — LOW (ref 1.2–3.4)
LYMPHOCYTES # BLD AUTO: 11.3 % — LOW (ref 20.5–51.1)
MCHC RBC-ENTMCNC: 29.5 PG — SIGNIFICANT CHANGE UP (ref 27–31)
MCHC RBC-ENTMCNC: 33.2 G/DL — SIGNIFICANT CHANGE UP (ref 32–37)
MCV RBC AUTO: 88.9 FL — SIGNIFICANT CHANGE UP (ref 80–94)
MONOCYTES # BLD AUTO: 0.51 K/UL — SIGNIFICANT CHANGE UP (ref 0.1–0.6)
MONOCYTES NFR BLD AUTO: 5.4 % — SIGNIFICANT CHANGE UP (ref 1.7–9.3)
NEUTROPHILS # BLD AUTO: 7.76 K/UL — HIGH (ref 1.4–6.5)
NEUTROPHILS NFR BLD AUTO: 82.6 % — HIGH (ref 42.2–75.2)
NRBC # BLD: 0 /100 WBCS — SIGNIFICANT CHANGE UP (ref 0–0)
PLATELET # BLD AUTO: 338 K/UL — SIGNIFICANT CHANGE UP (ref 130–400)
POTASSIUM SERPL-MCNC: 4.3 MMOL/L — SIGNIFICANT CHANGE UP (ref 3.5–5)
POTASSIUM SERPL-SCNC: 4.3 MMOL/L — SIGNIFICANT CHANGE UP (ref 3.5–5)
PROCALCITONIN SERPL-MCNC: 0.61 NG/ML — HIGH (ref 0.02–0.1)
PROT SERPL-MCNC: 6.4 G/DL — SIGNIFICANT CHANGE UP (ref 6–8)
RBC # BLD: 4.51 M/UL — LOW (ref 4.7–6.1)
RBC # FLD: 13.4 % — SIGNIFICANT CHANGE UP (ref 11.5–14.5)
SODIUM SERPL-SCNC: 134 MMOL/L — LOW (ref 135–146)
SPECIMEN SOURCE: SIGNIFICANT CHANGE UP
WBC # BLD: 9.4 K/UL — SIGNIFICANT CHANGE UP (ref 4.8–10.8)
WBC # FLD AUTO: 9.4 K/UL — SIGNIFICANT CHANGE UP (ref 4.8–10.8)

## 2020-04-03 PROCEDURE — 99233 SBSQ HOSP IP/OBS HIGH 50: CPT

## 2020-04-03 PROCEDURE — 93010 ELECTROCARDIOGRAM REPORT: CPT

## 2020-04-03 RX ORDER — AZITHROMYCIN 500 MG/1
500 TABLET, FILM COATED ORAL ONCE
Refills: 0 | Status: COMPLETED | OUTPATIENT
Start: 2020-04-03 | End: 2020-04-03

## 2020-04-03 RX ORDER — ASCORBIC ACID 60 MG
500 TABLET,CHEWABLE ORAL EVERY 8 HOURS
Refills: 0 | Status: DISCONTINUED | OUTPATIENT
Start: 2020-04-03 | End: 2020-04-07

## 2020-04-03 RX ORDER — ACETAMINOPHEN 500 MG
650 TABLET ORAL EVERY 6 HOURS
Refills: 0 | Status: DISCONTINUED | OUTPATIENT
Start: 2020-04-03 | End: 2020-04-07

## 2020-04-03 RX ORDER — ZINC SULFATE TAB 220 MG (50 MG ZINC EQUIVALENT) 220 (50 ZN) MG
220 TAB ORAL DAILY
Refills: 0 | Status: DISCONTINUED | OUTPATIENT
Start: 2020-04-03 | End: 2020-04-07

## 2020-04-03 RX ORDER — ZOLPIDEM TARTRATE 10 MG/1
5 TABLET ORAL AT BEDTIME
Refills: 0 | Status: DISCONTINUED | OUTPATIENT
Start: 2020-04-03 | End: 2020-04-07

## 2020-04-03 RX ORDER — AZITHROMYCIN 500 MG/1
250 TABLET, FILM COATED ORAL DAILY
Refills: 0 | Status: DISCONTINUED | OUTPATIENT
Start: 2020-04-04 | End: 2020-04-05

## 2020-04-03 RX ADMIN — ZOLPIDEM TARTRATE 5 MILLIGRAM(S): 10 TABLET ORAL at 21:40

## 2020-04-03 RX ADMIN — Medication 200 MILLIGRAM(S): at 21:40

## 2020-04-03 RX ADMIN — Medication 500 MILLIGRAM(S): at 09:31

## 2020-04-03 RX ADMIN — Medication 500 MILLIGRAM(S): at 17:34

## 2020-04-03 RX ADMIN — ZINC SULFATE TAB 220 MG (50 MG ZINC EQUIVALENT) 220 MILLIGRAM(S): 220 (50 ZN) TAB at 12:29

## 2020-04-03 RX ADMIN — Medication 500 MILLIGRAM(S): at 21:40

## 2020-04-03 RX ADMIN — Medication 1200 MILLIGRAM(S): at 09:31

## 2020-04-03 RX ADMIN — Medication 1200 MILLIGRAM(S): at 21:40

## 2020-04-03 RX ADMIN — AZITHROMYCIN 500 MILLIGRAM(S): 500 TABLET, FILM COATED ORAL at 09:31

## 2020-04-03 RX ADMIN — Medication 200 MILLIGRAM(S): at 09:31

## 2020-04-03 NOTE — PROGRESS NOTE ADULT - SUBJECTIVE AND OBJECTIVE BOX
ASHWIN RODRIGUEZ 65y Male  MRN#: [id number]   CODE STATUS:___Full_____      SUBJECTIVE  Patient is a 65y old Male who presents with a chief complaint of Cough, dyspnea (2020 13:47)  Currently admitted to medicine with the primary diagnosis of Hypoxia  Today is hospital day 2d, and this morning he is feeling about the same, but still having profuse diarrhea. Still same O2 requirement but somewhat less tachypnic. Still can't sleep at all.      OBJECTIVE  PAST MEDICAL & SURGICAL HISTORY  HTN (hypertension)  No significant past surgical history    ALLERGIES:  No Known Allergies    MEDICATIONS:  STANDING MEDICATIONS  ascorbic acid 500 milliGRAM(s) Oral every 8 hours  enoxaparin Injectable 40 milliGRAM(s) SubCutaneous daily  guaiFENesin ER 1200 milliGRAM(s) Oral every 12 hours  hydroxychloroquine 200 milliGRAM(s) Oral every 12 hours  hydroxychloroquine   Oral   lactated ringers. 1000 milliLiter(s) IV Continuous <Continuous>  zinc sulfate 220 milliGRAM(s) Oral daily    PRN MEDICATIONS  acetaminophen   Tablet .. 650 milliGRAM(s) Oral every 6 hours PRN      VITAL SIGNS: Last 24 Hours  T(C): 36.9 (2020 08:35), Max: 39.1 (2020 19:00)  T(F): 98.4 (2020 08:35), Max: 102.4 (2020 19:00)  HR: 102 (2020 08:35) (101 - 102)  BP: 140/77 (2020 08:35) (137/75 - 140/77)  BP(mean): --  RR: 20 (2020 08:35) (18 - 20)  SpO2: 95% (2020 08:35) (95% - 95%)    LABS:                        13.3   9.40  )-----------( 338      ( 2020 06:50 )             40.1     04-03    134<L>  |  92<L>  |  15  ----------------------------<  104<H>  4.3   |  26  |  0.9    Ca    8.3<L>      2020 06:50  Phos  2.6     04-  Mg     2.4     04-    TPro  6.4  /  Alb  3.7  /  TBili  1.4<H>  /  DBili  x   /  AST  172<H>  /  ALT  213<H>  /  AlkPhos  232<H>  -      Urinalysis Basic - ( 2020 12:00 )    Color: Yellow / Appearance: Clear / S.033 / pH: x  Gluc: x / Ketone: Trace  / Bili: Negative / Urobili: <2 mg/dL   Blood: x / Protein: 100 mg/dL / Nitrite: Negative   Leuk Esterase: Negative / RBC: 6 /HPF / WBC 14 /HPF   Sq Epi: x / Non Sq Epi: 3 /HPF / Bacteria: Negative            CARDIAC MARKERS ( 2020 07:04 )  x     / <0.01 ng/mL / x     / x     / x      CARDIAC MARKERS ( 2020 10:40 )  x     / <0.01 ng/mL / x     / x     / x          RADIOLOGY:      PHYSICAL EXAM:  GENERAL: NAD, well-developed, AAOx3, anxious and tired looking  HEENT:  Atraumatic, Normocephalic. EOMI, PERRLA, conjunctiva and sclera clear, No JVD  PULMONARY: Clear to auscultation bilaterally; RR20 No wheeze or crackles, on 3-4L NC  CARDIOVASCULAR: Regular rate ~100 and rhythm; No murmurs  GASTROINTESTINAL: mildly distended, non-tender, Soft, Bowel sounds active  NEUROLOGY: non-focal  SKIN: No rashes       ADMISSION SUMMARY  Patient is a 65y old Male who presents with a chief complaint of Cough, dyspnea (2020 13:47)  Currently admitted to medicine with the primary diagnosis of Hypoxia  65 year old M w/ PMHx of HTN presenting with worsening cough and dyspnea. Patient states that he has had 8 days of dry cough, associated with sob, worse with exertion, better when laying flat, fever, chills and non-bloody diarrhea. He has a son who tested positive with COVID after going to [place] for Spring break, returning on .  He and his wife is became sick with similar symptoms. Patient saturating 91% on room air at rest and 88% on room air with exertion.  During presentation he appears comfortable on nasal cannula (though not placed properly on his nose).      Currently comfortable, remains on O2 3L 95%.       ASSESSMENT & PLAN  65 year old M w/ PMHx of HTN presenting with worsening cough and dyspnea.    #) COVID19+ pneumonia  - 97% on 3L, c/w titrate as possible   - Use Supplemental O2 as needed. Maintain SaO2>90%  - CXR w/ Patchy bilateral opacifications   - C/w airborne and contact isolation  - CRP 10.9, procal 0.22, ferritin 4024 > 3830  - EKG QTc 460, EP consult placed  - c/w plaquanil, azithro    #) Insomnia, chronic  - happening for months, hasn't had a chance to speak to primary  - will try ambien tonight     #) Transaminitis, uptrending  - likely secondary to viral illness  - now uptrending, possible due to hydroxychloroquine and COVID    #) Hypertension  - Cont Metoprolol Succinate 25 (but home dose unknown?)    #MISC  DVT ppx:  Lovenox   GI ppx: Not indicated  Diet:  DASH   Activity: Increase as tolerated  Dispo: likely home 24-48hr    (x) Discussion with patient and/or family regarding goals of care  *** NO LABS TMW as blood work stable *** ASHWIN RODRIGUEZ 65y Male  MRN#: [id number]   CODE STATUS:___Full_____      SUBJECTIVE  Patient is a 65y old Male who presents with a chief complaint of Cough, dyspnea (2020 13:47)  Currently admitted to medicine with the primary diagnosis of Hypoxia  Today is hospital day 3d, and this morning he is feeling about the same, but still having profuse diarrhea. Still same O2 requirement but somewhat less tachypnic. Still can't sleep at all.      Attending Note: Pt seen and examined at bedside. No cp or sob. Feeling comfortable speaking full sentences. Having trouble sleeping.     OBJECTIVE  PAST MEDICAL & SURGICAL HISTORY  HTN (hypertension)  No significant past surgical history    ALLERGIES:  No Known Allergies    MEDICATIONS:  STANDING MEDICATIONS  ascorbic acid 500 milliGRAM(s) Oral every 8 hours  enoxaparin Injectable 40 milliGRAM(s) SubCutaneous daily  guaiFENesin ER 1200 milliGRAM(s) Oral every 12 hours  hydroxychloroquine 200 milliGRAM(s) Oral every 12 hours  hydroxychloroquine   Oral   lactated ringers. 1000 milliLiter(s) IV Continuous <Continuous>  zinc sulfate 220 milliGRAM(s) Oral daily    PRN MEDICATIONS  acetaminophen   Tablet .. 650 milliGRAM(s) Oral every 6 hours PRN      VITAL SIGNS: Last 24 Hours  T(C): 36.9 (2020 08:35), Max: 39.1 (2020 19:00)  T(F): 98.4 (2020 08:35), Max: 102.4 (2020 19:00)  HR: 102 (2020 08:35) (101 - 102)  BP: 140/77 (2020 08:35) (137/75 - 140/77)  BP(mean): --  RR: 20 (2020 08:35) (18 - 20)  SpO2: 95% (2020 08:35) (95% - 95%)    LABS:                        13.3   9.40  )-----------( 338      ( 2020 06:50 )             40.1     04-03    134<L>  |  92<L>  |  15  ----------------------------<  104<H>  4.3   |  26  |  0.9    Ca    8.3<L>      2020 06:50  Phos  2.6     04-  Mg     2.4     -    TPro  6.4  /  Alb  3.7  /  TBili  1.4<H>  /  DBili  x   /  AST  172<H>  /  ALT  213<H>  /  AlkPhos  232<H>  04-      Urinalysis Basic - ( 2020 12:00 )    Color: Yellow / Appearance: Clear / S.033 / pH: x  Gluc: x / Ketone: Trace  / Bili: Negative / Urobili: <2 mg/dL   Blood: x / Protein: 100 mg/dL / Nitrite: Negative   Leuk Esterase: Negative / RBC: 6 /HPF / WBC 14 /HPF   Sq Epi: x / Non Sq Epi: 3 /HPF / Bacteria: Negative            CARDIAC MARKERS ( 2020 07:04 )  x     / <0.01 ng/mL / x     / x     / x      CARDIAC MARKERS ( 2020 10:40 )  x     / <0.01 ng/mL / x     / x     / x          RADIOLOGY:      PHYSICAL EXAM:  GENERAL: NAD, well-developed, AAOx3, anxious and tired looking  HEENT:  Atraumatic, Normocephalic. EOMI, PERRLA, conjunctiva and sclera clear, No JVD  PULMONARY: Clear to auscultation bilaterally; RR20 No wheeze or crackles, on 3-4L NC  CARDIOVASCULAR: Regular rate ~100 and rhythm; No murmurs  GASTROINTESTINAL: mildly distended, non-tender, Soft, Bowel sounds active  NEUROLOGY: non-focal  SKIN: No rashes       ADMISSION SUMMARY  Patient is a 65y old Male who presents with a chief complaint of Cough, dyspnea (2020 13:47)  Currently admitted to medicine with the primary diagnosis of Hypoxia  65 year old M w/ PMHx of HTN presenting with worsening cough and dyspnea. Patient states that he has had 8 days of dry cough, associated with sob, worse with exertion, better when laying flat, fever, chills and non-bloody diarrhea. He has a son who tested positive with COVID after going to [place] for Spring break, returning on .  He and his wife is became sick with similar symptoms. Patient saturating 91% on room air at rest and 88% on room air with exertion.  During presentation he appears comfortable on nasal cannula (though not placed properly on his nose).      Currently comfortable, remains on O2 3L 95%.       ASSESSMENT & PLAN  65 year old M w/ PMHx of HTN presenting with worsening cough and dyspnea.    #) SOB 2/2 COVID19+ pneumonia  - 97% on 3L, c/w titrate as possible   - Use Supplemental O2 as needed. Maintain SaO2>90%  - CXR w/ Patchy bilateral opacifications   - C/w airborne and contact isolation  - CRP 17, procal 0.61, ferritin 4024 > 3830, dimer 200s will repeat   - EKG QTc 460, EP consult placed  - c/w plaquanil, azithro  -No IBU, only tylenol     #) Insomnia, chronic  - happening for months, hasn't had a chance to speak to primary  - will try ambien tonight     #) Transaminitis, uptrending  - likely secondary to viral illness  - now uptrending, possible due to hydroxychloroquine and COVID    #) Hypertension  - Cont Metoprolol Succinate 25 (but home dose unknown?)    #MISC  DVT ppx:  Lovenox   GI ppx: Not indicated  Diet:  DASH   Activity: Increase as tolerated  Dispo: likely home 24-48hr    (x) Discussion with patient and/or family regarding goals of care  *** NO LABS TMW as blood work stable ***

## 2020-04-04 LAB
ALBUMIN SERPL ELPH-MCNC: 3.5 G/DL — SIGNIFICANT CHANGE UP (ref 3.5–5.2)
ALP SERPL-CCNC: 256 U/L — HIGH (ref 30–115)
ALT FLD-CCNC: 220 U/L — HIGH (ref 0–41)
ANION GAP SERPL CALC-SCNC: 18 MMOL/L — HIGH (ref 7–14)
AST SERPL-CCNC: 131 U/L — HIGH (ref 0–41)
BASOPHILS # BLD AUTO: 0.02 K/UL — SIGNIFICANT CHANGE UP (ref 0–0.2)
BASOPHILS NFR BLD AUTO: 0.3 % — SIGNIFICANT CHANGE UP (ref 0–1)
BILIRUB SERPL-MCNC: 1.4 MG/DL — HIGH (ref 0.2–1.2)
BUN SERPL-MCNC: 15 MG/DL — SIGNIFICANT CHANGE UP (ref 10–20)
CALCIUM SERPL-MCNC: 8.3 MG/DL — LOW (ref 8.5–10.1)
CHLORIDE SERPL-SCNC: 95 MMOL/L — LOW (ref 98–110)
CO2 SERPL-SCNC: 23 MMOL/L — SIGNIFICANT CHANGE UP (ref 17–32)
CREAT SERPL-MCNC: 0.8 MG/DL — SIGNIFICANT CHANGE UP (ref 0.7–1.5)
CRP SERPL-MCNC: 13.73 MG/DL — HIGH (ref 0–0.4)
EOSINOPHIL # BLD AUTO: 0.03 K/UL — SIGNIFICANT CHANGE UP (ref 0–0.7)
EOSINOPHIL NFR BLD AUTO: 0.4 % — SIGNIFICANT CHANGE UP (ref 0–8)
FERRITIN SERPL-MCNC: 4989 NG/ML — HIGH (ref 30–400)
GLUCOSE SERPL-MCNC: 124 MG/DL — HIGH (ref 70–99)
HCT VFR BLD CALC: 41.3 % — LOW (ref 42–52)
HGB BLD-MCNC: 14.3 G/DL — SIGNIFICANT CHANGE UP (ref 14–18)
IMM GRANULOCYTES NFR BLD AUTO: 0.4 % — HIGH (ref 0.1–0.3)
LYMPHOCYTES # BLD AUTO: 1.01 K/UL — LOW (ref 1.2–3.4)
LYMPHOCYTES # BLD AUTO: 14.6 % — LOW (ref 20.5–51.1)
MCHC RBC-ENTMCNC: 31.3 PG — HIGH (ref 27–31)
MCHC RBC-ENTMCNC: 34.6 G/DL — SIGNIFICANT CHANGE UP (ref 32–37)
MCV RBC AUTO: 90.4 FL — SIGNIFICANT CHANGE UP (ref 80–94)
MONOCYTES # BLD AUTO: 0.55 K/UL — SIGNIFICANT CHANGE UP (ref 0.1–0.6)
MONOCYTES NFR BLD AUTO: 8 % — SIGNIFICANT CHANGE UP (ref 1.7–9.3)
NEUTROPHILS # BLD AUTO: 5.26 K/UL — SIGNIFICANT CHANGE UP (ref 1.4–6.5)
NEUTROPHILS NFR BLD AUTO: 76.3 % — HIGH (ref 42.2–75.2)
NRBC # BLD: 0 /100 WBCS — SIGNIFICANT CHANGE UP (ref 0–0)
PLATELET # BLD AUTO: 345 K/UL — SIGNIFICANT CHANGE UP (ref 130–400)
POTASSIUM SERPL-MCNC: 4.2 MMOL/L — SIGNIFICANT CHANGE UP (ref 3.5–5)
POTASSIUM SERPL-SCNC: 4.2 MMOL/L — SIGNIFICANT CHANGE UP (ref 3.5–5)
PROCALCITONIN SERPL-MCNC: 0.32 NG/ML — HIGH (ref 0.02–0.1)
PROT SERPL-MCNC: 6.4 G/DL — SIGNIFICANT CHANGE UP (ref 6–8)
RBC # BLD: 4.57 M/UL — LOW (ref 4.7–6.1)
RBC # FLD: 13.6 % — SIGNIFICANT CHANGE UP (ref 11.5–14.5)
SODIUM SERPL-SCNC: 136 MMOL/L — SIGNIFICANT CHANGE UP (ref 135–146)
WBC # BLD: 6.9 K/UL — SIGNIFICANT CHANGE UP (ref 4.8–10.8)
WBC # FLD AUTO: 6.9 K/UL — SIGNIFICANT CHANGE UP (ref 4.8–10.8)

## 2020-04-04 PROCEDURE — 71045 X-RAY EXAM CHEST 1 VIEW: CPT | Mod: 26

## 2020-04-04 PROCEDURE — 99233 SBSQ HOSP IP/OBS HIGH 50: CPT

## 2020-04-04 RX ADMIN — AZITHROMYCIN 250 MILLIGRAM(S): 500 TABLET, FILM COATED ORAL at 11:41

## 2020-04-04 RX ADMIN — Medication 500 MILLIGRAM(S): at 11:41

## 2020-04-04 RX ADMIN — Medication 500 MILLIGRAM(S): at 06:01

## 2020-04-04 RX ADMIN — Medication 200 MILLIGRAM(S): at 11:41

## 2020-04-04 RX ADMIN — Medication 500 MILLIGRAM(S): at 22:54

## 2020-04-04 RX ADMIN — ZOLPIDEM TARTRATE 5 MILLIGRAM(S): 10 TABLET ORAL at 23:43

## 2020-04-04 RX ADMIN — Medication 200 MILLIGRAM(S): at 22:54

## 2020-04-04 RX ADMIN — ZINC SULFATE TAB 220 MG (50 MG ZINC EQUIVALENT) 220 MILLIGRAM(S): 220 (50 ZN) TAB at 11:41

## 2020-04-04 NOTE — ED ADULT NURSE REASSESSMENT NOTE - NS ED NURSE REASSESS COMMENT FT1
Assumed care of patient at this time. Patient sitting on stretcher AAOx4 in no acute distress. Respirations easy and unlabored. He presents with cough and shortness of breath. He has been diagnosed with COVID-19. Airborne, droplet, and contact precautions being implemented. He is being admitted and awaiting admission bed at this time. Patient denies need for assistance, call bell in reach, will continue to monitor patient.
Endorsed patient to Deanne RN to continue care of patient. Assessment unchanged at transfer of care.
Patient remains resting comfortably on stretcher in no acute distress. Respirations easy and unlabored. Vital signs stable. He offers no complaints at this time. Still awaiting admission bed. Airborne, droplet, and contact precautions remain implemented. Patient denies need for assistance, call bell in reach, will continue to monitor patient.
Patient resting comfortably on stretcher in no acute distress. Respirations easy and unlabored. Assessment unchanged. He offers no complaints at this time. Vital signs stable. Still awaiting med surg admission bed. Airborne, contact, and droplet precautions remain implemented. Patient denies need for assistance, call bell in reach, will continue to monitor patient.
Patient resting comfortably on stretcher in no acute distress. Respirations easy and unlabored. He offers no complaints at this time. Night time medications given per MD orders. Vital signs stable. Still awaiting admission bed. Airborne, contact, and droplet precautions remain implemented. Patient denies need for assistance, call bell in reach, will continue to monitor patient.
pt linens changed, pt sitting up in chair expressing he is very tired and cannot sleep well. RN educated on meditation techniques for sleep. will continue to monitor pt.
Patient resting comfortably on stretcher in no acute distress. Respirations easy and unlabored. He offers no complaints at this time. Assessment unchanged. Awaiting admission bed at this time. Airborne, contact, and droplet precautions remain implemented. Patient denies need for assistance, call bell in reach, will continue to monitor patient.

## 2020-04-04 NOTE — PROGRESS NOTE ADULT - ASSESSMENT
65 year old M w/ PMHx of HTN presenting with worsening cough and dyspnea.    #) SOB 2/2 COVID19+ pneumonia  - 97% on 3L, c/w titrate as possible   - Use Supplemental O2 as needed. Maintain SaO2>90%  - CXR w/ Patchy bilateral opacifications   - C/w airborne and contact isolation  - CRP 17, procal 0.61, ferritin 4024 > 3830, dimer 200s will repeat   - EKG QTc 460, EP consult placed  - c/w plaquanil, azithro  -No IBU, only tylenol   - pt doing well on 96 on 3 L, will attempt to titrate down.     #) Insomnia, chronic  - happening for months, hasn't had a chance to speak to primary  - will try ambien tonight     #) Transaminitis, uptrending  - likely secondary to viral illness  - now uptrending, possible due to hydroxychloroquine and COVID  -No abd pain  -ID consult placed for question if HCQ needs to continue or can be switched. Will continue for now.   -Will check hep panel     #) Hypertension  - Cont Metoprolol Succinate 25 (but home dose unknown?)    #Progress Note Handoff  Pending (specify): supportive care, will attempt to titrate off o2  Family discussion: maricarmen pt and agreed to plan   Disposition: Home__x_/SNF___/Other________/Unknown at this time________

## 2020-04-04 NOTE — PROGRESS NOTE ADULT - SUBJECTIVE AND OBJECTIVE BOX
Pt seen and examined at bedside. No CP or SOB. Improving.     PAST MEDICAL & SURGICAL HISTORY:  HTN (hypertension)  No significant past surgical history      VITAL SIGNS (Last 24 hrs):  T(C): 37 (04-04-20 @ 06:01), Max: 37.3 (04-03-20 @ 16:44)  HR: 102 (04-04-20 @ 06:01) (84 - 103)  BP: 134/78 (04-04-20 @ 06:01) (122/74 - 138/78)  RR: 18 (04-04-20 @ 08:05) (16 - 18)  SpO2: 96% (04-04-20 @ 08:05) (91% - 99%)  Wt(kg): --  Daily     Daily     I&O's Summary      PHYSICAL EXAM:  GENERAL: NAD, well-developed  HEAD:  Atraumatic, Normocephalic  EYES: EOMI, PERRLA, conjunctiva and sclera clear  NECK: Supple, No JVD  CHEST/LUNG: Clear to auscultation bilaterally; No wheeze  HEART: Regular rate and rhythm; No murmurs, rubs, or gallops  ABDOMEN: Soft, Nontender, Nondistended; Bowel sounds present  EXTREMITIES:  2+ Peripheral Pulses, No clubbing, cyanosis, or edema  PSYCH: AAOx3  NEUROLOGY: non-focal  SKIN: No rashes or lesions    Labs Reviewed  Spoke to patient in regards to abnormal labs.    CBC Full  -  ( 04 Apr 2020 05:57 )  WBC Count : 6.90 K/uL  Hemoglobin : 14.3 g/dL  Hematocrit : 41.3 %  Platelet Count - Automated : 345 K/uL  Mean Cell Volume : 90.4 fL  Mean Cell Hemoglobin : 31.3 pg  Mean Cell Hemoglobin Concentration : 34.6 g/dL  Auto Neutrophil # : 5.26 K/uL  Auto Lymphocyte # : 1.01 K/uL  Auto Monocyte # : 0.55 K/uL  Auto Eosinophil # : 0.03 K/uL  Auto Basophil # : 0.02 K/uL  Auto Neutrophil % : 76.3 %  Auto Lymphocyte % : 14.6 %  Auto Monocyte % : 8.0 %  Auto Eosinophil % : 0.4 %  Auto Basophil % : 0.3 %    BMP:    04-04 @ 05:57    Blood Urea Nitrogen - 15  Calcium - 8.3  Carbond Dioxide - 23  Chloride - 95  Creatinine - 0.8  Glucose - 124  Potassium - 4.2  Sodium - 136      Hemoglobin A1c -     Urine Culture:  04-02 @ 12:00 Urine culture: --    Culture Results:   <10,000 CFU/mL Normal Urogenital Mary Jane  Method Type: --  Organism: --  Organism Identification: --  Specimen Source: .Urine Clean Catch (Midstream)        Imaging reviewed: < from: Xray Chest 1 View-PORTABLE IMMEDIATE (04.01.20 @ 10:56) >  Impression:      Patchy bilateral opacifications which may be indicative of viral pneumonia.  3. Clinical setting.    < end of copied text >        MEDICATIONS  (STANDING):  ascorbic acid 500 milliGRAM(s) Oral every 8 hours  azithromycin   Tablet 250 milliGRAM(s) Oral daily  enoxaparin Injectable 40 milliGRAM(s) SubCutaneous daily  guaiFENesin ER 1200 milliGRAM(s) Oral every 12 hours  hydroxychloroquine 200 milliGRAM(s) Oral every 12 hours  hydroxychloroquine   Oral   lactated ringers. 1000 milliLiter(s) (100 mL/Hr) IV Continuous <Continuous>  zinc sulfate 220 milliGRAM(s) Oral daily    MEDICATIONS  (PRN):  acetaminophen   Tablet .. 650 milliGRAM(s) Oral every 6 hours PRN Temp greater or equal to 38C (100.4F), Mild Pain (1 - 3)  zolpidem 5 milliGRAM(s) Oral at bedtime PRN Insomnia

## 2020-04-05 LAB
ALBUMIN SERPL ELPH-MCNC: 3.3 G/DL — LOW (ref 3.5–5.2)
ALP SERPL-CCNC: 278 U/L — HIGH (ref 30–115)
ALT FLD-CCNC: 169 U/L — HIGH (ref 0–41)
ANION GAP SERPL CALC-SCNC: 13 MMOL/L — SIGNIFICANT CHANGE UP (ref 7–14)
AST SERPL-CCNC: 85 U/L — HIGH (ref 0–41)
BASOPHILS # BLD AUTO: 0.02 K/UL — SIGNIFICANT CHANGE UP (ref 0–0.2)
BASOPHILS NFR BLD AUTO: 0.3 % — SIGNIFICANT CHANGE UP (ref 0–1)
BILIRUB SERPL-MCNC: 1.4 MG/DL — HIGH (ref 0.2–1.2)
BUN SERPL-MCNC: 13 MG/DL — SIGNIFICANT CHANGE UP (ref 10–20)
CALCIUM SERPL-MCNC: 8.5 MG/DL — SIGNIFICANT CHANGE UP (ref 8.5–10.1)
CHLORIDE SERPL-SCNC: 97 MMOL/L — LOW (ref 98–110)
CO2 SERPL-SCNC: 26 MMOL/L — SIGNIFICANT CHANGE UP (ref 17–32)
CREAT SERPL-MCNC: 0.8 MG/DL — SIGNIFICANT CHANGE UP (ref 0.7–1.5)
EOSINOPHIL # BLD AUTO: 0.1 K/UL — SIGNIFICANT CHANGE UP (ref 0–0.7)
EOSINOPHIL NFR BLD AUTO: 1.4 % — SIGNIFICANT CHANGE UP (ref 0–8)
GLUCOSE SERPL-MCNC: 132 MG/DL — HIGH (ref 70–99)
HAV IGM SER-ACNC: SIGNIFICANT CHANGE UP
HBV CORE IGM SER-ACNC: SIGNIFICANT CHANGE UP
HBV SURFACE AG SER-ACNC: SIGNIFICANT CHANGE UP
HCT VFR BLD CALC: 41.4 % — LOW (ref 42–52)
HCV AB S/CO SERPL IA: 0.13 S/CO — SIGNIFICANT CHANGE UP (ref 0–0.99)
HCV AB SERPL-IMP: SIGNIFICANT CHANGE UP
HGB BLD-MCNC: 13.7 G/DL — LOW (ref 14–18)
IMM GRANULOCYTES NFR BLD AUTO: 0.8 % — HIGH (ref 0.1–0.3)
LYMPHOCYTES # BLD AUTO: 1.23 K/UL — SIGNIFICANT CHANGE UP (ref 1.2–3.4)
LYMPHOCYTES # BLD AUTO: 17.1 % — LOW (ref 20.5–51.1)
MCHC RBC-ENTMCNC: 29.6 PG — SIGNIFICANT CHANGE UP (ref 27–31)
MCHC RBC-ENTMCNC: 33.1 G/DL — SIGNIFICANT CHANGE UP (ref 32–37)
MCV RBC AUTO: 89.4 FL — SIGNIFICANT CHANGE UP (ref 80–94)
MONOCYTES # BLD AUTO: 0.76 K/UL — HIGH (ref 0.1–0.6)
MONOCYTES NFR BLD AUTO: 10.6 % — HIGH (ref 1.7–9.3)
NEUTROPHILS # BLD AUTO: 5.03 K/UL — SIGNIFICANT CHANGE UP (ref 1.4–6.5)
NEUTROPHILS NFR BLD AUTO: 69.8 % — SIGNIFICANT CHANGE UP (ref 42.2–75.2)
NRBC # BLD: 0 /100 WBCS — SIGNIFICANT CHANGE UP (ref 0–0)
PLATELET # BLD AUTO: 418 K/UL — HIGH (ref 130–400)
POTASSIUM SERPL-MCNC: 3.9 MMOL/L — SIGNIFICANT CHANGE UP (ref 3.5–5)
POTASSIUM SERPL-SCNC: 3.9 MMOL/L — SIGNIFICANT CHANGE UP (ref 3.5–5)
PROT SERPL-MCNC: 6.1 G/DL — SIGNIFICANT CHANGE UP (ref 6–8)
RBC # BLD: 4.63 M/UL — LOW (ref 4.7–6.1)
RBC # FLD: 13.4 % — SIGNIFICANT CHANGE UP (ref 11.5–14.5)
SODIUM SERPL-SCNC: 136 MMOL/L — SIGNIFICANT CHANGE UP (ref 135–146)
WBC # BLD: 7.2 K/UL — SIGNIFICANT CHANGE UP (ref 4.8–10.8)
WBC # FLD AUTO: 7.2 K/UL — SIGNIFICANT CHANGE UP (ref 4.8–10.8)

## 2020-04-05 PROCEDURE — 93010 ELECTROCARDIOGRAM REPORT: CPT

## 2020-04-05 PROCEDURE — 99233 SBSQ HOSP IP/OBS HIGH 50: CPT

## 2020-04-05 RX ADMIN — Medication 500 MILLIGRAM(S): at 04:24

## 2020-04-05 RX ADMIN — ENOXAPARIN SODIUM 40 MILLIGRAM(S): 100 INJECTION SUBCUTANEOUS at 13:11

## 2020-04-05 RX ADMIN — Medication 1200 MILLIGRAM(S): at 13:10

## 2020-04-05 RX ADMIN — Medication 1200 MILLIGRAM(S): at 21:58

## 2020-04-05 RX ADMIN — ZOLPIDEM TARTRATE 5 MILLIGRAM(S): 10 TABLET ORAL at 22:01

## 2020-04-05 RX ADMIN — Medication 200 MILLIGRAM(S): at 13:29

## 2020-04-05 RX ADMIN — AZITHROMYCIN 250 MILLIGRAM(S): 500 TABLET, FILM COATED ORAL at 13:11

## 2020-04-05 RX ADMIN — Medication 200 MILLIGRAM(S): at 21:58

## 2020-04-05 RX ADMIN — Medication 500 MILLIGRAM(S): at 21:58

## 2020-04-05 RX ADMIN — Medication 500 MILLIGRAM(S): at 13:10

## 2020-04-05 RX ADMIN — ZINC SULFATE TAB 220 MG (50 MG ZINC EQUIVALENT) 220 MILLIGRAM(S): 220 (50 ZN) TAB at 13:10

## 2020-04-05 NOTE — CONSULT NOTE ADULT - ASSESSMENT
ASSESSMENT  65y M admitted with HYPOXIA; DYSPNEA ON EXERTION; VIRAL ILLNESS; TRANSAMINITIS; VIRAL PNEUMONIA    HPI:  65 year old M w/ PMHx of HTN presenting with worsening cough and dyspnea. Patient states that he has had 8 days of dry cough, associated with sob, worse with exertion, better when laying flat, fever, chills and non-bloody diarrhea. He has a son who tested positive with COVID after going to [place] for Spring break, returning on March 16th.  He and his wife is became sick with similar symptoms. Patient saturating 91% on room air at rest and 88% on room air with exertion.  During presentation he appears comfortable on nasal cannula (though not placed properly on his nose).      PROBLEMS  1. Severe Sepsis with SIRS & lactic acidosis due to COVID-19 Pneumonia  4/4 Cxray with Patchy bilateral pulmonary opacities, slightly decreased from 4/1/2020.   Procalcitonin 0.32    New problem with additional W/U   acute illness with systemic symptoms    On azithromycin   Tablet 250 milliGRAM(s) Oral daily  hydroxychloroquine 200 milliGRAM(s) Oral every 12 hours  QTC Calculation(Bezet) 477 ms on 4/3    2. TBili  1.4<H>  /  DBili  x   /  AST  131<H>  /  ALT  220<H>  /  AlkPhos  256<H>   Had rise in T bili      PLAN  D/C Zithromax  - Continue hydroxychloroquine   Monitor LFTS & QTC

## 2020-04-05 NOTE — CONSULT NOTE ADULT - SUBJECTIVE AND OBJECTIVE BOX
ASHWIN RODRIGUEZ  65y, Male  Allergy: No Known Allergies      CHIEF COMPLAINT: Cough, dyspnea (04 Apr 2020 14:31)      HPI:  65 year old M w/ PMHx of HTN presenting with worsening cough and dyspnea. Patient states that he has had 8 days of dry cough, associated with sob, worse with exertion, better when laying flat, fever, chills and non-bloody diarrhea. He has a son who tested positive with COVID after going to [place] for Spring break, returning on March 16th.  He and his wife is became sick with similar symptoms. Patient saturating 91% on room air at rest and 88% on room air with exertion.  During presentation he appears comfortable on nasal cannula (though not placed properly on his nose).      T(C): 38 (04-01-20 @ 09:34), Max: 38 (04-01-20 @ 09:34)  HR: 118 (04-01-20 @ 09:34) (118 - 118)  BP: 127/72 (04-01-20 @ 09:34) (127/72 - 127/72)  RR: 18 (04-01-20 @ 09:34) (18 - 18)  SpO2: 91% (04-01-20 @ 09:34) (91% - 91%) (01 Apr 2020 13:47)    FAMILY HISTORY:    PAST MEDICAL & SURGICAL HISTORY:  HTN (hypertension)  No significant past surgical history      SOCIAL HISTORY  Social History:  Marital Status:  ( x  )    (   ) Single    (   )    (  )   Lives with: (  ) alone  ( x ) children   ( x ) spouse   (  ) parents  (  ) other  Recent Travel: No recent travel  Occupation:  Retired [place]    Substance Use (street drugs): ( x ) never used  (  ) other:  Tobacco Usage:  (   ) never smoked   (   ) former smoker   (  x ) current  occasional cigar smoker  (     ) pack year  Alcohol Usage: None (01 Apr 2020 13:47)        ROS  General: Denies fevers, chills, nightsweats, weight loss  HEENT: Denies headache, rhinorrhea, sore throat, eye pain  CV: Denies CP, palpitations  PULM: Denies SOB, cough  GI: Denies abdominal pain, diarrhea  : Denies dysuria, hematuria  MSK: Denies arthralgias  SKIN: Denies rash   NEURO: Denies paresthesias, weakness  PSYCH: Denies depression    VITALS:  T(F): 97.6, Max: 97.7 (04-04-20 @ 16:55)  HR: 89  BP: 146/89  RR: 18Vital Signs Last 24 Hrs  T(C): 36.4 (05 Apr 2020 03:00), Max: 36.5 (04 Apr 2020 16:55)  T(F): 97.6 (05 Apr 2020 03:00), Max: 97.7 (04 Apr 2020 16:55)  HR: 89 (05 Apr 2020 03:00) (89 - 99)  BP: 146/89 (05 Apr 2020 03:00) (145/74 - 146/89)  BP(mean): --  RR: 18 (05 Apr 2020 03:00) (18 - 18)  SpO2: 92% (05 Apr 2020 03:00) (91% - 96%)    PHYSICAL EXAM:  Gen: NAD, resting in bed  HEENT: Normocephalic, atraumatic  Neck: supple, no lymphadenopathy  CV: Regular rate & regular rhythm  Lungs: decreased BS at bases, no fremitus  Abdomen: Soft, BS present  Ext: Warm, well perfused  Neuro: non focal, awake  Skin: no rash, no erythema    TESTS & MEASUREMENTS:                        14.3   6.90  )-----------( 345      ( 04 Apr 2020 05:57 )             41.3     04-04    136  |  95<L>  |  15  ----------------------------<  124<H>  4.2   |  23  |  0.8    Ca    8.3<L>      04 Apr 2020 05:57    TPro  6.4  /  Alb  3.5  /  TBili  1.4<H>  /  DBili  x   /  AST  131<H>  /  ALT  220<H>  /  AlkPhos  256<H>  04-04      LIVER FUNCTIONS - ( 04 Apr 2020 05:57 )  Alb: 3.5 g/dL / Pro: 6.4 g/dL / ALK PHOS: 256 U/L / ALT: 220 U/L / AST: 131 U/L / GGT: x               Culture - Urine (collected 04-02-20 @ 12:00)  Source: .Urine Clean Catch (Midstream)  Final Report (04-03-20 @ 16:45):    <10,000 CFU/mL Normal Urogenital Mary Jane        Blood Gas Venous - Lactate: 2.0 mmoL/L (04-01-20 @ 11:13)      INFECTIOUS DISEASES TESTING  Hepatitis B Surface Antigen: Nonreact (04-04-20 @ 15:58)      RADIOLOGY & ADDITIONAL TESTS:  I have personally reviewed the last Chest xray  CXR  Xray Chest 1 View- PORTABLE-Urgent:   EXAM:  XR CHEST PORTABLE URGENT 1V            PROCEDURE DATE:  04/04/2020            INTERPRETATION:  Clinical History / Reason for exam: Shortness of breath    Comparison : Chest radiograph 4/1/2020.    Technique/Positioning: Chest radiograph.    Findings:    Support devices: None.    Cardiac/mediastinum/hilum: Stable    Lung parenchyma/Pleura: Patchy bilateral pulmonary opacities, slightly decreased from prior. No pneumothorax.    Skeleton/soft tissues: Stable    Impression:      Patchy bilateral pulmonary opacities, slightly decreased from 4/1/2020.                   MARY MASTERS M.D., ATTENDING RADIOLOGIST  This document has been electronically signed. Apr 4 2020  6:59PM             (04-04-20 @ 18:48)      CT      CARDIOLOGY TESTING  12 Lead ECG:   Ventricular Rate 99 BPM    Atrial Rate 99 BPM    P-R Interval 162 ms    QRS Duration 98 ms    Q-T Interval 372 ms    QTC Calculation(Bezet) 477 ms    P Axis 59 degrees    R Axis 86 degrees    T Axis 22 degrees    Diagnosis Line Normal sinus rhythm  Normal ECG    Confirmed by AMPARO VALENZUELA MD [id number]) on 4/3/2020 8:49:12 PM (04-03-20 @ 15:53)  12 Lead ECG:   Ventricular Rate 108 BPM    Atrial Rate 108 BPM    P-R Interval 152 ms    QRS Duration 104 ms    Q-T Interval 344 ms    QTC Calculation(Bezet) 460 ms    P Axis 51 degrees    R Axis 88 degrees    T Axis 20 degrees    Diagnosis Line Sinus tachycardia  Otherwise normal ECG    Confirmed by Baldev Palm ([id number]) on 4/1/2020 5:14:09 PM (04-01-20 @ 10:50)      MEDICATIONS  ascorbic acid 500  azithromycin   Tablet 250  enoxaparin Injectable 40  guaiFENesin ER 1200  hydroxychloroquine 200  hydroxychloroquine   lactated ringers. 1000  zinc sulfate 220      ANTIBIOTICS:  azithromycin   Tablet 250 milliGRAM(s) Oral daily  hydroxychloroquine 200 milliGRAM(s) Oral every 12 hours  hydroxychloroquine   Oral       All available historical data has been reviewed    ASSESSMENT  65y M admitted with HYPOXIA;DYSPNEA ON EXERTION;VIRAL ILLNESS;TRANSAMINITIS;VIRAL PNEUMONIA      PROBLEMS  Pt with Severe Sepsis (T<96.8F, T>101F, Pulse>90, Resp Rate>20, WBC>12, wbc<4, Bands>10%), lactic acidosis, metabolic encephalopathy, SUNNY due to suspected Gram negative pneumonia, aspiration pneumonia, UTI (    New problem with additional W/U   acute illness with systemic symptoms    On azithromycin   Tablet 250 milliGRAM(s) Oral daily  hydroxychloroquine 200 milliGRAM(s) Oral every 12 hours      PLAN  - Await blood cultures, urine culture, wound culture  - Await susceptibilities  - Continue Ceftriaxone, Zithromax, Cefepime  - Repeat Blood Cultures x2  - Vanco trough prior to 4th dose   - Repeat sodium, wbc ASHWIN RODRIGUEZ  65y, Male  Allergy: No Known Allergies      CHIEF COMPLAINT: Cough, dyspnea (04 Apr 2020 14:31)      HPI:  65 year old M w/ PMHx of HTN presenting with worsening cough and dyspnea. Patient states that he has had 8 days of dry cough, associated with sob, worse with exertion, better when laying flat, fever, chills and non-bloody diarrhea. He has a son who tested positive with COVID after going to [place] for Spring break, returning on March 16th.  He and his wife is became sick with similar symptoms. Patient saturating 91% on room air at rest and 88% on room air with exertion.  During presentation he appears comfortable on nasal cannula (though not placed properly on his nose).      T(C): 38 (04-01-20 @ 09:34), Max: 38 (04-01-20 @ 09:34)  HR: 118 (04-01-20 @ 09:34) (118 - 118)  BP: 127/72 (04-01-20 @ 09:34) (127/72 - 127/72)  RR: 18 (04-01-20 @ 09:34) (18 - 18)  SpO2: 91% (04-01-20 @ 09:34) (91% - 91%) (01 Apr 2020 13:47)    FAMILY HISTORY:    PAST MEDICAL & SURGICAL HISTORY:  HTN (hypertension)  No significant past surgical history      SOCIAL HISTORY  Social History:  Marital Status:  ( x  )    (   ) Single    (   )    (  )   Lives with: (  ) alone  ( x ) children   ( x ) spouse   (  ) parents  (  ) other  Recent Travel: No recent travel  Occupation:  Retired [place]    Substance Use (street drugs): ( x ) never used  (  ) other:  Tobacco Usage:  (   ) never smoked   (   ) former smoker   (  x ) current  occasional cigar smoker  (     ) pack year  Alcohol Usage: None (01 Apr 2020 13:47)        ROS  HEENT: Denies headache, rhinorrhea, sore throat, eye pain  CV: Denies CP, palpitations  GI: Denies abdominal pain, diarrhea  : Denies dysuria, hematuria  MSK: Denies arthralgias  SKIN: Denies rash   NEURO: Denies paresthesias, weakness  PSYCH: Denies depression    VITALS:  T(F): 97.6, Max: 97.7 (04-04-20 @ 16:55)  HR: 89  BP: 146/89  RR: 18Vital Signs Last 24 Hrs  T(C): 36.4 (05 Apr 2020 03:00), Max: 36.5 (04 Apr 2020 16:55)  T(F): 97.6 (05 Apr 2020 03:00), Max: 97.7 (04 Apr 2020 16:55)  HR: 89 (05 Apr 2020 03:00) (89 - 99)  BP: 146/89 (05 Apr 2020 03:00) (145/74 - 146/89)  BP(mean): --  RR: 18 (05 Apr 2020 03:00) (18 - 18)  SpO2: 92% (05 Apr 2020 03:00) (91% - 96%)    PHYSICAL EXAM:  see below     TESTS & MEASUREMENTS:                        14.3   6.90  )-----------( 345      ( 04 Apr 2020 05:57 )             41.3     04-04    136  |  95<L>  |  15  ----------------------------<  124<H>  4.2   |  23  |  0.8    Ca    8.3<L>      04 Apr 2020 05:57    TPro  6.4  /  Alb  3.5  /  TBili  1.4<H>  /  DBili  x   /  AST  131<H>  /  ALT  220<H>  /  AlkPhos  256<H>  04-04      LIVER FUNCTIONS - ( 04 Apr 2020 05:57 )  Alb: 3.5 g/dL / Pro: 6.4 g/dL / ALK PHOS: 256 U/L / ALT: 220 U/L / AST: 131 U/L / GGT: x               Culture - Urine (collected 04-02-20 @ 12:00)  Source: .Urine Clean Catch (Midstream)  Final Report (04-03-20 @ 16:45):    <10,000 CFU/mL Normal Urogenital Mary Jane        Blood Gas Venous - Lactate: 2.0 mmoL/L (04-01-20 @ 11:13)      INFECTIOUS DISEASES TESTING  Hepatitis B Surface Antigen: Nonreact (04-04-20 @ 15:58)      RADIOLOGY & ADDITIONAL TESTS:  I have personally reviewed the last Chest xray  CXR  Xray Chest 1 View- PORTABLE-Urgent:   EXAM:  XR CHEST PORTABLE URGENT 1V            PROCEDURE DATE:  04/04/2020            INTERPRETATION:  Clinical History / Reason for exam: Shortness of breath    Comparison : Chest radiograph 4/1/2020.    Technique/Positioning: Chest radiograph.    Findings:    Support devices: None.    Cardiac/mediastinum/hilum: Stable    Lung parenchyma/Pleura: Patchy bilateral pulmonary opacities, slightly decreased from prior. No pneumothorax.    Skeleton/soft tissues: Stable    Impression:      Patchy bilateral pulmonary opacities, slightly decreased from 4/1/2020.                   MARY MASTERS M.D., ATTENDING RADIOLOGIST  This document has been electronically signed. Apr 4 2020  6:59PM             (04-04-20 @ 18:48)      CT      CARDIOLOGY TESTING  12 Lead ECG:   Ventricular Rate 99 BPM    Atrial Rate 99 BPM    P-R Interval 162 ms    QRS Duration 98 ms    Q-T Interval 372 ms    QTC Calculation(Bezet) 477 ms    P Axis 59 degrees    R Axis 86 degrees    T Axis 22 degrees    Diagnosis Line Normal sinus rhythm  Normal ECG    Confirmed by AMPARO VALENZUELA MD ([id number]) on 4/3/2020 8:49:12 PM (04-03-20 @ 15:53)  12 Lead ECG:   Ventricular Rate 108 BPM    Atrial Rate 108 BPM    P-R Interval 152 ms    QRS Duration 104 ms    Q-T Interval 344 ms    QTC Calculation(Bezet) 460 ms    P Axis 51 degrees    R Axis 88 degrees    T Axis 20 degrees    Diagnosis Line Sinus tachycardia  Otherwise normal ECG    Confirmed by Baldev Palm ([id number]) on 4/1/2020 5:14:09 PM (04-01-20 @ 10:50)      MEDICATIONS  ascorbic acid 500  azithromycin   Tablet 250  enoxaparin Injectable 40  guaiFENesin ER 1200  hydroxychloroquine 200  hydroxychloroquine   lactated ringers. 1000  zinc sulfate 220      ANTIBIOTICS:  azithromycin   Tablet 250 milliGRAM(s) Oral daily  hydroxychloroquine 200 milliGRAM(s) Oral every 12 hours  hydroxychloroquine   Oral       All available historical data has been reviewed

## 2020-04-05 NOTE — PROGRESS NOTE ADULT - SUBJECTIVE AND OBJECTIVE BOX
Pt seen and examined at bedside. No CP or SOB. DOing well     PAST MEDICAL & SURGICAL HISTORY:  HTN (hypertension)  No significant past surgical history      VITAL SIGNS (Last 24 hrs):  T(C): 36.2 (04-05-20 @ 12:00), Max: 36.5 (04-04-20 @ 16:55)  HR: 99 (04-05-20 @ 12:00) (89 - 104)  BP: 139/80 (04-05-20 @ 12:00) (139/80 - 146/89)  RR: 22 (04-05-20 @ 12:00) (18 - 24)  SpO2: 93% (04-05-20 @ 12:00) (86% - 93%)  Wt(kg): --  Daily     Daily     I&O's Summary      PHYSICAL EXAM:  GENERAL: NAD, well-developed  HEAD:  Atraumatic, Normocephalic  EYES: EOMI, PERRLA, conjunctiva and sclera clear  NECK: Supple, No JVD  CHEST/LUNG: Clear to auscultation bilaterally; No wheeze  HEART: Regular rate and rhythm; No murmurs, rubs, or gallops  ABDOMEN: Soft, Nontender, Nondistended; Bowel sounds present  EXTREMITIES:  2+ Peripheral Pulses, No clubbing, cyanosis, or edema  PSYCH: AAOx3  NEUROLOGY: non-focal  SKIN: No rashes or lesions    Labs Reviewed  Spoke to patient in regards to abnormal labs.    CBC Full  -  ( 05 Apr 2020 13:09 )  WBC Count : 7.20 K/uL  Hemoglobin : 13.7 g/dL  Hematocrit : 41.4 %  Platelet Count - Automated : 418 K/uL  Mean Cell Volume : 89.4 fL  Mean Cell Hemoglobin : 29.6 pg  Mean Cell Hemoglobin Concentration : 33.1 g/dL  Auto Neutrophil # : 5.03 K/uL  Auto Lymphocyte # : 1.23 K/uL  Auto Monocyte # : 0.76 K/uL  Auto Eosinophil # : 0.10 K/uL  Auto Basophil # : 0.02 K/uL  Auto Neutrophil % : 69.8 %  Auto Lymphocyte % : 17.1 %  Auto Monocyte % : 10.6 %  Auto Eosinophil % : 1.4 %  Auto Basophil % : 0.3 %    BMP:    04-05 @ 13:09    Blood Urea Nitrogen - 13  Calcium - 8.5  Carbond Dioxide - 26  Chloride - 97  Creatinine - 0.8  Glucose - 132  Potassium - 3.9  Sodium - 136      Hemoglobin A1c -     Urine Culture:  04-02 @ 12:00 Urine culture: --    Culture Results:   <10,000 CFU/mL Normal Urogenital Mary Jane  Method Type: --  Organism: --  Organism Identification: --  Specimen Source: .Urine Clean Catch (Midstream)        Imaging reviewed      MEDICATIONS  (STANDING):  ascorbic acid 500 milliGRAM(s) Oral every 8 hours  enoxaparin Injectable 40 milliGRAM(s) SubCutaneous daily  guaiFENesin ER 1200 milliGRAM(s) Oral every 12 hours  hydroxychloroquine 200 milliGRAM(s) Oral every 12 hours  hydroxychloroquine   Oral   lactated ringers. 1000 milliLiter(s) (100 mL/Hr) IV Continuous <Continuous>  zinc sulfate 220 milliGRAM(s) Oral daily    MEDICATIONS  (PRN):  acetaminophen   Tablet .. 650 milliGRAM(s) Oral every 6 hours PRN Temp greater or equal to 38C (100.4F), Mild Pain (1 - 3)  zolpidem 5 milliGRAM(s) Oral at bedtime PRN Insomnia

## 2020-04-05 NOTE — PROGRESS NOTE ADULT - ASSESSMENT
65 year old M w/ PMHx of HTN presenting with worsening cough and dyspnea.    #) SOB 2/2 COVID19+ pneumonia  - 97% on 3L, c/w titrate as possible   - Use Supplemental O2 as needed. Maintain SaO2>90%  - CXR w/ Patchy bilateral opacifications   - C/w airborne and contact isolation  - CRP 17, procal 0.61, ferritin 4024 > 3830, dimer 200s will repeat   - EKG QTc 460, EP consult placed  - c/w plaquanil, azithro  -No IBU, only tylenol   Pt now 93 at rest but desats on ambulation. 86%, will likely need home o2. [facility] SW unable to prepare today, they will need to be done 4/6    #) Insomnia, chronic  - happening for months, hasn't had a chance to speak to primary  - will try ambien tonight     #) Transaminitis, uptrending  - likely secondary to viral illness  - now uptrending, possible due to hydroxychloroquine and COVID  -No abd pain  -ID consult placed for question if HCQ needs to continue or can be switched. Will continue for now.   -Will check hep panel , wnl  -stable today    #) Hypertension  - Cont Metoprolol Succinate 25 (but home dose unknown?)    #Progress Note Handoff  Pending (specify): stable, will need o2 set up on 4/6, possible dc 4/6 if o2 able to set up.   Family discussion: marco antonio pt and agreed to plan   Disposition: Home__x_/SNF___/Other________/Unknown at this time________

## 2020-04-06 ENCOUNTER — TRANSCRIPTION ENCOUNTER (OUTPATIENT)
Age: 65
End: 2020-04-06

## 2020-04-06 PROCEDURE — 99233 SBSQ HOSP IP/OBS HIGH 50: CPT

## 2020-04-06 RX ADMIN — Medication 1200 MILLIGRAM(S): at 21:52

## 2020-04-06 RX ADMIN — Medication 500 MILLIGRAM(S): at 13:07

## 2020-04-06 RX ADMIN — Medication 500 MILLIGRAM(S): at 21:52

## 2020-04-06 RX ADMIN — ENOXAPARIN SODIUM 40 MILLIGRAM(S): 100 INJECTION SUBCUTANEOUS at 13:07

## 2020-04-06 RX ADMIN — Medication 200 MILLIGRAM(S): at 09:08

## 2020-04-06 RX ADMIN — ZINC SULFATE TAB 220 MG (50 MG ZINC EQUIVALENT) 220 MILLIGRAM(S): 220 (50 ZN) TAB at 13:07

## 2020-04-06 RX ADMIN — Medication 500 MILLIGRAM(S): at 05:19

## 2020-04-06 RX ADMIN — Medication 1200 MILLIGRAM(S): at 09:02

## 2020-04-06 RX ADMIN — ZOLPIDEM TARTRATE 5 MILLIGRAM(S): 10 TABLET ORAL at 21:57

## 2020-04-06 NOTE — DISCHARGE NOTE PROVIDER - HOSPITAL COURSE
65 year old M w/ PMHx of HTN presenting with worsening cough and dyspnea. Found to be hypoxemic and have COVID19 pneumonia, improved with supportive care, Hydroxychloroquine and azithromycin. Patient now on room air, medically stable to be discharged.    Of note, patient had transaminitis, likely from COVID19.         Day of Discharge Physical Exam:        T(C): 36.7 (04-06-20 @ 08:30), Max: 36.7 (04-06-20 @ 08:30)    HR: 107 (04-06-20 @ 08:30) (78 - 107)    BP: 150/92 (04-06-20 @ 08:30) (130/92 - 159/84)    RR: 20 (04-06-20 @ 08:30) (20 - 24)    SpO2: 91% (04-06-20 @ 08:30) (91% - 92%)        GENERAL: NAD, well-developed, 65y sitting up in bed    EENT: EOMI, conjunctiva and sclera clear, No nasal obstruction or discharge    RESPIRATORY: Decreased breath sounds bilateral bases, No wheeze or crackles, on room air, comfortable at rest with some dyspnea speaking full sentences    CARDIOVASCULAR: Regular rate and rhythm; No murmurs, rubs, or gallops, no pitting edema    GASTROINTESTINAL: Abdomen Soft, Nontender, Nondistended    MUSCULOSKELETAL:  No cyanosis, extremities grossly symmetrical    PSYCH: AAO, affect appropriate    NEUROLOGY: non-focal, cognition grossly intact, MAEE 65 year old M w/ PMHx of HTN presenting with worsening cough and dyspnea. Found to be hypoxemic and have COVID19 pneumonia, improved with supportive care, Hydroxychloroquine and azithromycin. Patient now on room air, medically stable to be discharged.    Of note, patient had transaminitis, likely from COVID19.

## 2020-04-06 NOTE — PROGRESS NOTE ADULT - SUBJECTIVE AND OBJECTIVE BOX
I made rounds today with the treatment team including the hospitalist, residents,  nurses and  and discussed the patient's current medical status and discharge  planning needs, and reviewed the chart.    T(C): 36.7 (04-06-20 @ 08:30), Max: 36.7 (04-06-20 @ 08:30)  HR: 107 (04-06-20 @ 08:30) (78 - 107)  BP: 150/92 (04-06-20 @ 08:30) (130/92 - 159/84)  RR: 20 (04-06-20 @ 08:30) (20 - 24)  SpO2: 91% (04-06-20 @ 08:30) (91% - 92%)          I reached out to the patient's health care proxy/ responsible family member-           [     ]  I reached                                     and discussed the patient's medical condition,                   family concerns, and discharge planning           [  x   ]  I left a message with family               [     ]  I personally participated in rounds with the medical team and my resident and discussed the case. My resident reached                   family member/ HCP                                under my direction and supervision  and we reviewed the conversaion.          [     ]  My resident left a message with family under my direction and supervision    The following was discussed: I attempted to notify wife of patients status, and to see if she has any questions regarding discharge, but there was no answer.          [   x  ] I spent 5-10 minutes on the above discussing medical issues with team members and family and/ or my resident    [     ] I spent 11-20 minutes on the above discussing medical issues with team members and family and/ or my resident    [     ] I spent 21-30 minutes on the above discussing medical issues with team members and family and/ or my resident

## 2020-04-06 NOTE — PROGRESS NOTE ADULT - ASSESSMENT
65 year old M w/ PMHx of HTN presenting with worsening cough and dyspnea.    #) SOB 2/2 COVID19+ pneumonia  - on RA presently 91 %  - CXR w/ Patchy bilateral opacifications   - - CRP 17, procal 0.61, ferritin 4024 > 3830, dimer 200s - EKG QTc 460, EP consult placed  - c/w plaquanil, azithro  -      #) Insomnia, chronic  - happening for months, hasn't had a chance to speak to primary  -  #) Transaminitis,downtrending  - likely secondary to viral illness  --No abd pain  -ID consult suggested to complete chlaroquine  -Will check hep panel , wnl  -stable today    #) Hypertension  - Cont Metoprolol Succinate 25 (but home dose unknown?)    DC home today--spent more than 30mins. 65 year old M w/ PMHx of HTN presenting with worsening cough and dyspnea.    #) SOB 2/2 COVID19+ pneumonia  - on RA presently 91 %  - CXR w/ Patchy bilateral opacifications did not change much in last 4 days  - - CRP 17, procal 0.61, ferritin 4024 > 3830, dimer 200s - c/w plaquanil, azithro  -      #) Insomnia, chronic  - happening for months, hasn't had a chance to speak to primary  -  #) Transaminitis,downtrending  - likely secondary to viral illness  --No abd pain  -ID consult suggested to complete chloroquine   hep panel , wnl  -    #) Hypertension  - Cont Metoprolol Succinate 25 (but home dose unknown?)    DC home today--spent more than 30mins. if no req of oxygen on ambulation.

## 2020-04-06 NOTE — DISCHARGE NOTE PROVIDER - NSDCFUADDAPPT_GEN_ALL_CORE_FT
Please make an appointment to see your primary care provider within a week. To see a provider at [facility], contact the clinic at [phone].

## 2020-04-06 NOTE — PROGRESS NOTE ADULT - ASSESSMENT
65 year old M w/ PMHx of HTN presenting with worsening cough and dyspnea.    #) COVID19+ pneumonia  - CXR w/ Patchy bilateral opacifications did not change much in last 4 days  - CRP 17, procal 0.61, ferritin 4024 > 3830, dimer 200s   -Now on room air but only 89% on ambulation  -Completed course of Plaquenil azithro  --Supportive care  --Zinc, vit C    #) Insomnia, chronic  - happening for months, hasn't had a chance to speak to primary  --Ambien PRN    #) Transaminitis, downtrending  - likely secondary to viral illness  -No abd pain  - hep panel wnl    #) Hypertension, controlled  -Currently not on meds here, takes metoprolol at home dose unknown    DXT PPX: Lovenox  GI PPX not indicated  Diet DASH  Disposition; Back to home tomorrow  CODE FULL

## 2020-04-06 NOTE — PROGRESS NOTE ADULT - SUBJECTIVE AND OBJECTIVE BOX
SUBJECTIVE:    Patient is a 65y old Male who presents with a chief complaint of Cough, dyspnea (05 Apr 2020 15:46)    Currently admitted to medicine with the primary diagnosis of Hypoxia     Today is hospital day 5d.     PAST MEDICAL & SURGICAL HISTORY  HTN (hypertension)  No significant past surgical history    ALLERGIES:  No Known Allergies    MEDICATIONS:  STANDING MEDICATIONS  ascorbic acid 500 milliGRAM(s) Oral every 8 hours  enoxaparin Injectable 40 milliGRAM(s) SubCutaneous daily  guaiFENesin ER 1200 milliGRAM(s) Oral every 12 hours  lactated ringers. 1000 milliLiter(s) IV Continuous <Continuous>  zinc sulfate 220 milliGRAM(s) Oral daily    PRN MEDICATIONS  acetaminophen   Tablet .. 650 milliGRAM(s) Oral every 6 hours PRN  zolpidem 5 milliGRAM(s) Oral at bedtime PRN    VITALS:   T(F): 98.1  HR: 107  BP: 150/92  RR: 20  SpO2: 91%    LABS:                        13.7   7.20  )-----------( 418      ( 05 Apr 2020 13:09 )             41.4     04-05    136  |  97<L>  |  13  ----------------------------<  132<H>  3.9   |  26  |  0.8    Ca    8.5      05 Apr 2020 13:09    TPro  6.1  /  Alb  3.3<L>  /  TBili  1.4<H>  /  DBili  x   /  AST  85<H>  /  ALT  169<H>  /  AlkPhos  278<H>  04-05                  RADIOLOGY:    PHYSICAL EXAM:  GEN: No acute distress  LUNGS: Clear to auscultation bilaterally   HEART: S1/S2 present. RRR.   ABD/ GI: Soft, non-tender, non-distended. Bowel sounds present  EXT: NC/NC/NE/2+PP/GORDON  NEURO: AAOX3

## 2020-04-06 NOTE — PROGRESS NOTE ADULT - SUBJECTIVE AND OBJECTIVE BOX
ASHWIN RODRIGUEZ 65y Male  MRN#: [id number]     SUBJECTIVE  Patient is a 65y old Male who presents with a chief complaint of Cough, dyspnea (06 Apr 2020 14:38)      Today is hospital day 5d, and this morning he is lying in bed without distress. Shortness of breath is minimal at rest, stated he walked to bathroom but had to rest when he got there. Afebrile.   No acute overnight events.     OBJECTIVE  PAST MEDICAL & SURGICAL HISTORY  HTN (hypertension)  No significant past surgical history    ALLERGIES:  No Known Allergies    MEDICATIONS:  STANDING MEDICATIONS  ascorbic acid 500 milliGRAM(s) Oral every 8 hours  enoxaparin Injectable 40 milliGRAM(s) SubCutaneous daily  guaiFENesin ER 1200 milliGRAM(s) Oral every 12 hours  lactated ringers. 1000 milliLiter(s) IV Continuous <Continuous>  zinc sulfate 220 milliGRAM(s) Oral daily    PRN MEDICATIONS  acetaminophen   Tablet .. 650 milliGRAM(s) Oral every 6 hours PRN  zolpidem 5 milliGRAM(s) Oral at bedtime PRN    HOME MEDICATIONS  Home Medications:      LABS:                        13.7   7.20  )-----------( 418      ( 05 Apr 2020 13:09 )             41.4     04-05    136  |  97<L>  |  13  ----------------------------<  132<H>  3.9   |  26  |  0.8    Ca    8.5      05 Apr 2020 13:09    TPro  6.1  /  Alb  3.3<L>  /  TBili  1.4<H>  /  DBili  x   /  AST  85<H>  /  ALT  169<H>  /  AlkPhos  278<H>  04-05    LIVER FUNCTIONS - ( 05 Apr 2020 13:09 )  Alb: 3.3 g/dL / Pro: 6.1 g/dL / ALK PHOS: 278 U/L / ALT: 169 U/L / AST: 85 U/L / GGT: x                         CAPILLARY BLOOD GLUCOSE          PHYSICAL EXAM:  T(C): 36.1 (04-06-20 @ 16:00), Max: 36.7 (04-06-20 @ 08:30)  HR: 107 (04-06-20 @ 16:00) (78 - 107)  BP: 119/77 (04-06-20 @ 16:00) (119/77 - 159/84)  RR: 21 (04-06-20 @ 16:00) (20 - 24)  SpO2: 94% (04-06-20 @ 16:00) (91% - 94%)    GENERAL: NAD, well-developed, 65y sitting up in bed  EENT: EOMI, conjunctiva and sclera clear, No nasal obstruction or discharge  RESPIRATORY: Decreased breath sounds basilar bilaterally; No wheeze or crackles; on room air, some dyspnea when speaking full sentences  CARDIOVASCULAR: Regular rate and rhythm; No murmurs, rubs, or gallops, no pitting edema  GASTROINTESTINAL: Abdomen Soft, Nontender, Nondistended  MUSCULOSKELETAL:  No cyanosis, extremities grossly symmetrical  PSYCH: AAO, affect appropriate  NEUROLOGY: non-focal, cognition grossly intact, MAEE    ADMISSION SUMMARY  Patient is a 65y old Male who presents with a chief complaint of Cough, dyspnea (06 Apr 2020 14:38)

## 2020-04-07 ENCOUNTER — TRANSCRIPTION ENCOUNTER (OUTPATIENT)
Age: 65
End: 2020-04-07

## 2020-04-07 VITALS
SYSTOLIC BLOOD PRESSURE: 146 MMHG | DIASTOLIC BLOOD PRESSURE: 95 MMHG | OXYGEN SATURATION: 96 % | HEART RATE: 102 BPM | TEMPERATURE: 98 F | RESPIRATION RATE: 18 BRPM

## 2020-04-07 LAB
CRP SERPL-MCNC: 5.99 MG/DL — HIGH (ref 0–0.4)
PROCALCITONIN SERPL-MCNC: 0.11 NG/ML — HIGH (ref 0.02–0.1)

## 2020-04-07 PROCEDURE — 99239 HOSP IP/OBS DSCHRG MGMT >30: CPT

## 2020-04-07 RX ORDER — ASCORBIC ACID 60 MG
1 TABLET,CHEWABLE ORAL
Qty: 30 | Refills: 0
Start: 2020-04-07 | End: 2020-05-06

## 2020-04-07 RX ADMIN — Medication 500 MILLIGRAM(S): at 06:04

## 2020-04-07 RX ADMIN — Medication 500 MILLIGRAM(S): at 13:02

## 2020-04-07 RX ADMIN — ZINC SULFATE TAB 220 MG (50 MG ZINC EQUIVALENT) 220 MILLIGRAM(S): 220 (50 ZN) TAB at 12:16

## 2020-04-07 RX ADMIN — ENOXAPARIN SODIUM 40 MILLIGRAM(S): 100 INJECTION SUBCUTANEOUS at 12:17

## 2020-04-07 RX ADMIN — Medication 1200 MILLIGRAM(S): at 08:06

## 2020-04-07 NOTE — PROGRESS NOTE ADULT - SUBJECTIVE AND OBJECTIVE BOX
ASHWIN RODRIGUEZ 65y Male  MRN#: [id number]     SUBJECTIVE  Patient is a 65y old Male who presents with a chief complaint of Cough, dyspnea (06 Apr 2020 16:42)      Today is hospital day 6d, and this morning he is lying in bed without distress. Afebrile overnight, 92% on room air and 92% when walking on room air.   No acute overnight events.     OBJECTIVE  PAST MEDICAL & SURGICAL HISTORY  HTN (hypertension)  No significant past surgical history    ALLERGIES:  No Known Allergies    MEDICATIONS:  STANDING MEDICATIONS  ascorbic acid 500 milliGRAM(s) Oral every 8 hours  enoxaparin Injectable 40 milliGRAM(s) SubCutaneous daily  guaiFENesin ER 1200 milliGRAM(s) Oral every 12 hours  lactated ringers. 1000 milliLiter(s) IV Continuous <Continuous>  zinc sulfate 220 milliGRAM(s) Oral daily    PRN MEDICATIONS  acetaminophen   Tablet .. 650 milliGRAM(s) Oral every 6 hours PRN  zolpidem 5 milliGRAM(s) Oral at bedtime PRN    HOME MEDICATIONS  Home Medications:      LABS:                        13.7   7.20  )-----------( 418      ( 05 Apr 2020 13:09 )             41.4     04-05    136  |  97<L>  |  13  ----------------------------<  132<H>  3.9   |  26  |  0.8    Ca    8.5      05 Apr 2020 13:09    TPro  6.1  /  Alb  3.3<L>  /  TBili  1.4<H>  /  DBili  x   /  AST  85<H>  /  ALT  169<H>  /  AlkPhos  278<H>  04-05    LIVER FUNCTIONS - ( 05 Apr 2020 13:09 )  Alb: 3.3 g/dL / Pro: 6.1 g/dL / ALK PHOS: 278 U/L / ALT: 169 U/L / AST: 85 U/L / GGT: x                         CAPILLARY BLOOD GLUCOSE          PHYSICAL EXAM:  T(C): 36.3 (04-07-20 @ 08:25), Max: 36.8 (04-07-20 @ 01:00)  HR: 103 (04-07-20 @ 08:25) (102 - 107)  BP: 147/83 (04-07-20 @ 08:25) (119/77 - 147/92)  RR: 18 (04-07-20 @ 08:25) (18 - 21)  SpO2: 94% (04-07-20 @ 10:41) (92% - 94%)    Attending to examine    ADMISSION SUMMARY  Patient is a 65y old Male who presents with a chief complaint of Cough, dyspnea (06 Apr 2020 16:42)

## 2020-04-07 NOTE — PROGRESS NOTE ADULT - ATTENDING COMMENTS
64 y/o M who presents with hypoxia, diagnosed with COVID-19, currently breathing is stable on 4L NC. Cont plaquenil, monitor QTc, add azithro if symptoms worsen.
#Progress Note Handoff  Pending (specify):  supportive care, titrate O2, pt in day 11 of covid course, monitor sats  Family discussion: maricarmen pt and agreed to plan   Disposition: Home__x_/SNF___/Other________/Unknown at this time________
Patient seen and examined independently. Agree with resident note.  # COVID-- remained stable on RA-- all labs improved and CXR remained stable.  DC home spent more than 30mins.

## 2020-04-07 NOTE — DISCHARGE NOTE NURSING/CASE MANAGEMENT/SOCIAL WORK - PATIENT PORTAL LINK FT
You can access the FollowMyHealth Patient Portal offered by [facility] by registering at the following website: [email or web address]. By joining [facility]’s FollowMyHealth portal, you will also be able to view your health information using other applications (apps) compatible with our system.

## 2020-04-07 NOTE — PROGRESS NOTE ADULT - REASON FOR ADMISSION
Cough, dyspnea

## 2020-04-07 NOTE — PROGRESS NOTE ADULT - ASSESSMENT
65 year old M w/ PMHx of HTN presenting with worsening cough and dyspnea. Found to be hypoxemic and have COVID19 pneumonia, improved with supportive care, Hydroxychloroquine and azithromycin. Patient now on room air, medically stable to be discharged.    #) COVID19+ pneumonia  - CXR w/ Patchy bilateral opacifications did not change much in last 4 days  - CRP 17, procal 0.61, ferritin 4024 > 3830, dimer 200s   -Now on room air satting well at rest and also on ambulation  -Completed course of Plaquenil azithro  --Supportive care  --Zinc, vit C    #) Insomnia, chronic  - happening for months, hasn't had a chance to speak to primary  --Ambien PRN    #) Transaminitis, downtrending  - likely secondary to viral illness  -No abd pain  - hep panel wnl    #) Hypertension, controlled  -Currently not on meds here, takes metoprolol at home dose unknown    DXT PPX: Lovenox  GI PPX not indicated  Diet DASH  Disposition; Back to home today   CODE FULL

## 2020-04-07 NOTE — PHYSICAL THERAPY INITIAL EVALUATION ADULT - SPECIFY REASON(S)
PT evaluation attempted. Patient states he does not need PT. He is walking without assistive device on room air. No stairs to enter home. Patient d/c today as per MD.

## 2020-04-07 NOTE — PROGRESS NOTE ADULT - SUBJECTIVE AND OBJECTIVE BOX
I made rounds today with the treatment team including the hospitalist, residents,  nurses and  and discussed the patient's current medical status and discharge  planning needs, and reviewed the chart.    T(C): 36.7 (04-07-20 @ 12:25), Max: 36.8 (04-07-20 @ 01:00)  HR: 102 (04-07-20 @ 12:25) (102 - 107)  BP: 146/95 (04-07-20 @ 12:25) (119/77 - 147/92)  RR: 18 (04-07-20 @ 12:25) (18 - 21)  SpO2: 96% (04-07-20 @ 12:25) (92% - 96%)          I reached out to the patient's health care proxy/ responsible family member-           [   x  ]  I reached    Jennifer, his wife and discussed the patient's medical condition,                   family concerns, and discharge planning           [     ]  I left a message with family               [     ]  I personally participated in rounds with the medical team and my resident and discussed the case. My resident reached                   family member/ HCP                                under my direction and supervision  and we reviewed the conversation.          [     ]  My resident left a message with family under my direction and supervision    The following was discussed:  He is medically stable and ready for discharge today. He is stable on room air. We discussed home isolation protocols. She is in agreement and awaiting a phone call when he is ready to be picked up.          [     ] I spent 5-10 minutes on the above discussing medical issues with team members and family and/ or my resident    [  x   ] I spent 11-20 minutes on the above discussing medical issues with team members and family and/ or my resident    [     ] I spent 21-30 minutes on the above discussing medical issues with team members and family and/ or my resident

## 2020-04-22 NOTE — CDI QUERY NOTE - NSCDIOTHERTXTBX_GEN_ALL_CORE_HH
DOCUMENTATION CLARIFICATION FORM     Encounter #: [id number]                                                      Patient’s Name: Hermilo Stringer  Medical Record #: [id number]                                            Admit Date: 4-1-2020  CDI Specialist/: Sky                                               Contact #: [phone]    Dear Dr. Macias,                       Date: 4-                   The Physician’s or Provider’s documentation of the patient’s presentation, evaluation and  medical management, as identified below, may support a diagnosis that is not documented in the medical record.  In order to accurately capture all diagnoses to the greatest degree of specificity reflecting the patient’s actual severity of illness, the documentation in this patient’s medical record requires additional clarification.  Please include more specific documentation, either known or suspected, of a corresponding diagnosis associated with the clinical information described below in your Progress Note and/or Discharge Summary.  65yoM c/o worsening cough and dyspnea   DX- Covid 19 PNA  In ED- T 100.4, , Lactate 2.0, Neutrophil % 87.8 Lymphocyte % 7.3  Cxray with Patchy bilateral pulmonary opacities  Started on- plaquenil and azithromycin  4-12 ID Consult-   Severe Sepsis with SIRS & lactic acidosis due to COVID-19 Pneumonia- Procalcitonin 0.32    Based on the clinical indicators and your professional judgment please clarify if you agree with the Infectious Disease Consultant documentation of the sepsis diagnosis:  (  ) Yes, Severe Sepsis 2/2 Covid 19 was present on admission  (  ) No, Severe Sepsis 2/2 Covid 19 was evaluated and after study, ruled out  (  ) Other (please specify): _______  (  ) Unable to clinically determine       Documentation clarification is required for compliance, accuracy in coding and billing, and reporting severity of illness, quality data   and risk of mortality.  --------------------------------------------------------------------------------------------------------------------------------------------  DO NOT REMOVE THIS RECORD WITHOUT FIRST NOTIFYING THE CDI SPECIALIST  This form is NOT a part of the permanent Medical Record.

## 2020-05-23 ENCOUNTER — TRANSCRIPTION ENCOUNTER (OUTPATIENT)
Age: 65
End: 2020-05-23

## 2020-05-24 ENCOUNTER — TRANSCRIPTION ENCOUNTER (OUTPATIENT)
Age: 65
End: 2020-05-24

## 2020-08-13 ENCOUNTER — EMERGENCY (EMERGENCY)
Facility: HOSPITAL | Age: 65
LOS: 0 days | Discharge: HOME | End: 2020-08-13
Attending: EMERGENCY MEDICINE | Admitting: EMERGENCY MEDICINE
Payer: COMMERCIAL

## 2020-08-13 VITALS
RESPIRATION RATE: 16 BRPM | OXYGEN SATURATION: 96 % | TEMPERATURE: 98 F | SYSTOLIC BLOOD PRESSURE: 150 MMHG | DIASTOLIC BLOOD PRESSURE: 84 MMHG | WEIGHT: 210.1 LBS | HEART RATE: 87 BPM

## 2020-08-13 VITALS
OXYGEN SATURATION: 98 % | HEART RATE: 80 BPM | RESPIRATION RATE: 18 BRPM | DIASTOLIC BLOOD PRESSURE: 88 MMHG | SYSTOLIC BLOOD PRESSURE: 147 MMHG | TEMPERATURE: 98 F
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
## 2020-08-13 DIAGNOSIS — M79.642 PAIN IN LEFT HAND: ICD-10-CM
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
## 2020-08-13 DIAGNOSIS — M25.561 PAIN IN RIGHT KNEE: ICD-10-CM
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
## 2020-08-13 DIAGNOSIS — I10 ESSENTIAL (PRIMARY) HYPERTENSION: ICD-10-CM

## 2020-08-13 DIAGNOSIS — E78.5 HYPERLIPIDEMIA, UNSPECIFIED: ICD-10-CM

## 2020-08-13 DIAGNOSIS — R07.89 OTHER CHEST PAIN: ICD-10-CM

## 2020-08-13 PROCEDURE — 99284 EMERGENCY DEPT VISIT MOD MDM: CPT

## 2020-08-13 PROCEDURE — 73562 X-RAY EXAM OF KNEE 3: CPT | Mod: 26,RT

## 2020-08-13 PROCEDURE — 71046 X-RAY EXAM CHEST 2 VIEWS: CPT | Mod: 26

## 2020-08-13 PROCEDURE — 73130 X-RAY EXAM OF HAND: CPT | Mod: 26,LT

## 2020-08-13 RX ORDER — IBUPROFEN 200 MG
600 TABLET ORAL ONCE
Refills: 0 | Status: COMPLETED | OUTPATIENT
Start: 2020-08-13 | End: 2020-08-13

## 2020-08-13 RX ORDER — IBUPROFEN 200 MG
1 TABLET ORAL
Qty: 12 | Refills: 0
Start: 2020-08-13 | End: 2020-08-15

## 2020-08-13 RX ORDER — METHOCARBAMOL 500 MG/1
2 TABLET, FILM COATED ORAL
Qty: 12 | Refills: 0
Start: 2020-08-13 | End: 2020-08-15

## 2020-08-13 RX ADMIN — Medication 600 MILLIGRAM(S): at 15:35
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
## 2020-08-13 NOTE — ED PROVIDER NOTE - PATIENT PORTAL LINK FT
You can access the FollowMyHealth Patient Portal offered by [facility] by registering at the following website: [email or web address]. By joining [facility]’s FollowMyHealth portal, you will also be able to view your health information using other applications (apps) compatible with our system.

## 2020-08-13 NOTE — ED ADULT NURSE NOTE - CHPI ED NUR SYMPTOMS NEG
no stiffness/no difficulty bearing weight/no bruising/no abrasion/no deformity/no tingling/no weakness/no numbness

## 2020-08-13 NOTE — ED PROVIDER NOTE - PHYSICAL EXAMINATION
Vital Signs: I have reviewed the initial vital signs.  Constitutional: well-nourished, appears stated age, no acute distress.  HEENT: Airway patent, moist MM, no erythema/swelling/deformity of oral structures. EOMI, PERRLA.  CV: regular rate, regular rhythm, well-perfused extremities, 2+ b/l DP and radial pulses equal.  Lungs: BCTA, no increased WOB.  ABD: soft, NTND, no guarding or rebound, no pulsatile mass, no hernias.   MSK: Neck supple, nontender, nl ROM, no stepoff. Minimal midsternal chest wall ttp. Back nontender in TLS spine or to b/l bony structures or flanks. Left hand shows swelling over lateral hand with ttp over dorsal 5th carpal>palmar aspect, no wrist ttp, no scaphoid tenderness, normal sensation, limited 5th finger flexion d/t pain, normal OK/thumbs up.   INTEG: Skin warm, dry, no rash.  NEURO: A&Ox3, moving all extremities, normal speech  PSYCH: Calm, cooperative, normal affect and interaction.
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
## 2020-08-13 NOTE — ED PROVIDER NOTE - CARE PROVIDER_API CALL
Andre Rand  ORTHOPAEDIC SURGERY  [address] [place] [address]  Phone: [phone]  Fax: [phone]  Follow Up Time: 4-6 Days
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
## 2021-02-23 PROBLEM — E78.00 PURE HYPERCHOLESTEROLEMIA, UNSPECIFIED: Chronic | Status: ACTIVE | Noted: 2020-08-13

## 2021-06-02 PROBLEM — Z00.00 ENCOUNTER FOR PREVENTIVE HEALTH EXAMINATION: Status: ACTIVE | Noted: 2021-06-02

## 2021-06-03 ENCOUNTER — APPOINTMENT (OUTPATIENT)
Dept: PULMONOLOGY | Facility: CLINIC | Age: 66
End: 2021-06-03

## 2021-11-21 ENCOUNTER — TRANSCRIPTION ENCOUNTER (OUTPATIENT)
Age: 66
End: 2021-11-21

## 2022-02-16 NOTE — ED ADULT NURSE NOTE - DRUG PRE-SCREENING (DAST -1)
(Inserted Image. Unable to display)   [address] [place] [address]  January 15, 2019      CANDICE SHANTE       [address] [place], [place] [id number]      Dear CANDICE,    Thank you for selecting [facility] for your healthcare needs. Below you will find the results of the recent tests done at our clinic.    Your lab results are listed below. Overall these are stable although your cholesterol levels are up a little. We will recheck in one year.    Result Name Current Result Previous Result Reference Range   Sodium Level (mmol/L)  139 1/14/2019  137 5/10/2018   140 3/23/2017 135 - 146   Potassium Level (mmol/L)  5.0 1/14/2019  4.8 5/10/2018   4.5 3/23/2017 3.5 - 5.3   Chloride Level (mmol/L)  103 1/14/2019  103 5/10/2018   105 3/23/2017 98 - 110   CO2 Level (mmol/L)  30 1/14/2019  23 5/10/2018   26 3/23/2017 20 - 32   Glucose Level (mg/dL)  99 1/14/2019  98 5/10/2018   87 3/23/2017 65 - 99   BUN (mg/dL)  12 1/14/2019  20 5/10/2018   21 3/23/2017 7 - 25   Creatinine Level (mg/dL)  0.97 1/14/2019  1.03 5/10/2018   1.04 3/23/2017 0.70 - 1.33   Calcium Level (mg/dL)  10.3 1/14/2019  9.6 5/10/2018   9.9 3/23/2017 8.6 - 10.3   Cholesterol (mg/dL) ((H)) 202 1/14/2019  182 5/10/2018  ((H)) 210 7/25/2017  - <200   Non-HDL Cholesterol ((H)) 166 1/14/2019 ((H)) 143 5/10/2018  ((H)) 172 7/25/2017  - <130   HDL (mg/dL) ((L)) 36 1/14/2019 ((L)) 39 5/10/2018  ((L)) 38 7/25/2017 >40 -    Cholesterol/HDL Ratio ((H)) 5.6 1/14/2019  4.7 5/10/2018  ((H)) 5.5 7/25/2017  - <5.0   LDL  See comment 1/14/2019  94 5/10/2018   See comment 7/25/2017    LDL Direct (mg/dL)  87 1/14/2019  78 7/25/2017   102 3/23/2017  - <100   Triglyceride (mg/dL) ((H)) 443 1/14/2019 ((H)) 366 5/10/2018  ((H)) 653 7/25/2017  - <150       Please contact me or my assistant at [phone] if you have any questions or concerns.     Sincerely,        Luisa Ramirez M.D.  
Statement Selected